# Patient Record
Sex: FEMALE | Race: WHITE | Employment: OTHER | URBAN - METROPOLITAN AREA
[De-identification: names, ages, dates, MRNs, and addresses within clinical notes are randomized per-mention and may not be internally consistent; named-entity substitution may affect disease eponyms.]

---

## 2017-09-29 ENCOUNTER — HOSPITAL ENCOUNTER (OUTPATIENT)
Dept: WOUND CARE | Age: 82
Discharge: HOME | End: 2017-09-29
Payer: MEDICARE

## 2017-09-29 PROCEDURE — 97597 DBRDMT OPN WND 1ST 20 CM/<: CPT

## 2017-09-29 RX ORDER — FLUTICASONE PROPIONATE 220 UG/1
2 AEROSOL, METERED RESPIRATORY (INHALATION) 2 TIMES DAILY
COMMUNITY
End: 2018-11-05 | Stop reason: SDUPTHER

## 2017-09-29 RX ORDER — BISMUTH SUBSALICYLATE 262 MG
1 TABLET,CHEWABLE ORAL DAILY
COMMUNITY

## 2017-09-29 RX ORDER — MONTELUKAST SODIUM 10 MG/1
10 TABLET ORAL DAILY
COMMUNITY
End: 2018-08-20 | Stop reason: SDUPTHER

## 2017-09-29 RX ORDER — CALCIUM CARBONATE 500(1250)
1200 TABLET ORAL DAILY
COMMUNITY

## 2017-09-29 RX ORDER — METOLAZONE 2.5 MG/1
2.5 TABLET ORAL DAILY
COMMUNITY

## 2017-09-29 RX ORDER — SPIRONOLACTONE 25 MG/1
25 TABLET ORAL DAILY
COMMUNITY
End: 2018-08-20 | Stop reason: SDUPTHER

## 2017-09-29 RX ORDER — LIDOCAINE HYDROCHLORIDE 20 MG/ML
JELLY TOPICAL ONCE
Status: COMPLETED | OUTPATIENT
Start: 2017-09-29 | End: 2017-09-29

## 2017-09-29 RX ORDER — ACETAMINOPHEN 325 MG/1
500 TABLET ORAL
COMMUNITY
End: 2018-09-26

## 2017-09-29 RX ORDER — SIMVASTATIN 20 MG/1
20 TABLET, FILM COATED ORAL
COMMUNITY
End: 2018-08-20 | Stop reason: SDUPTHER

## 2017-09-29 RX ORDER — ALBUTEROL SULFATE 90 UG/1
2 AEROSOL, METERED RESPIRATORY (INHALATION)
COMMUNITY
End: 2018-11-02 | Stop reason: SDUPTHER

## 2017-09-29 RX ORDER — MIRTAZAPINE 15 MG/1
15 TABLET, FILM COATED ORAL
COMMUNITY
End: 2018-09-17 | Stop reason: SDUPTHER

## 2017-09-29 RX ORDER — ACETAMINOPHEN AND CODEINE PHOSPHATE 300; 30 MG/1; MG/1
1 TABLET ORAL
COMMUNITY
End: 2018-09-26

## 2017-09-29 RX ORDER — FUROSEMIDE 80 MG/1
80 TABLET ORAL DAILY
COMMUNITY
End: 2018-09-17 | Stop reason: SDUPTHER

## 2017-09-29 RX ADMIN — LIDOCAINE HYDROCHLORIDE: 20 JELLY TOPICAL at 08:35

## 2017-09-29 NOTE — PROGRESS NOTES
Chief Complaint (CC): torn skin R arm . Present Illness (PI): Patient reports frequent falls due to trip, being in a hurry, with no balance issues. Does use a cane and walker but this time was in a dark [employer], fell and tore skin of R arm. Previously this arm had a very large tear from a fall now healed. Past History (PMedHx): Is on Eliquis due to A fib. Has bilateral shoulder joint repairs and arthritic knees R/L other ordonez: Mariposa Eliud Medications and Allergies: as per today's data for this patient in '[facility]'. I have reviewed and concur. Illnesses: as per '[facility]' data noted today. Surgeries and Injuries: as per '[facility]' data noted today. Review of Systems (ROS):                        Integumentary: Other than as noted in 'PI'; skin hair and nails normal for age, with no new rash, lumps, bumps, eruptions or bleeding. Lymph: no new prominent nodes or drainage near lymph nodes. Bones, Joints, and Muscles: Other than as noted in 'PI' no new fractures, dislocations, weakness or pain. As above. Hematopoietic: no new bleeding or bruising or anemia changes. Eliquis effect. Eyes: no recent trauma or inflammation. has. Eye glasses. has. Intra Occular Lens Implants (IOLI)                        Ears: Hearing is unchanged and usually good. Nose: no new drainage, rhinorhea or epistaxis. Mouth, and throat: no soreness, drainage or lesions. none. Dentures. Neck: no new masses, drainage or pain                        Respiratory; no hemoptysis, current shortness of breath or pain with breath. Cardiovascular: No chest pain, palpitation or tachycardia. A fib. Gastrointestinal: no recent change in appetite, stools or food tolerance.  No jaundice, hematemesis, vomiting or diarrhea                        Genito-Urinary: urine color, frequency, sensation unchanged                        Neurologic: no syncope, dizzyness or unusual sensations. Psychologic and Mental Status: no change in mood, sleep or memory recently     Social History: '[facility]' data today is noted. Daughter helps with her care. Family History: '[facility]' data today is noted. Eleazar Coburn Physical Exam:      General:  alert appropriate, NAD. Head, Eyes, Ears, Nose and Throat: normocephalic, R pupil post op changes, IOLI-OU noted. Neck: supple without masses or adenopathy. No bruit. Chest: full excursion without deformity, . Lungs: few dry crackles. .  Heart: irregular/irregular no M. Abdomen: soft round non tender (note Inguinal hernia repair). Vascular:                         Pulses:                                            R                    L                                               Radial                        ++. ++.                                              Femoral                     +.                 +.                                              DP                             +.                 +.                                              PT                             +.                 +.  Extremities: RUE skin avlulsion with ulcer and exudate near elbow laterally. Note numerous keratoses, healed scars (shoulders , arms) bruises in these areas as well as both LEs. 1+ pitting R>L LE  Neuro: normal speech, antalgic gate but good coordination     Vital signs and data recorded in '[facility]' for this patient today is noted, reviewed and considered. Patient notes today: Also reports planning to see her PCP next week. .      Procedure Note     Name of Procedure: sharp debridement. PreOp diagnosis: open wound with ulcer. .  Anaesthesia: topical lidocaine.   Description: using a sharp curette I removed the adherent exudate down to a good bleeding base..  Blood Loss: minimal.  Post Op Diagnosis, and condition: minimal pain with debridement. Follow Up Plan: begin endoform+Hydraferra blue daily. Prognosis: good opportunity for healing .

## 2017-10-06 ENCOUNTER — HOSPITAL ENCOUNTER (OUTPATIENT)
Dept: WOUND CARE | Age: 82
Discharge: HOME | End: 2017-10-06
Payer: MEDICARE

## 2017-10-06 PROCEDURE — 11042 DBRDMT SUBQ TIS 1ST 20SQCM/<: CPT

## 2017-10-06 RX ORDER — LIDOCAINE HYDROCHLORIDE 20 MG/ML
JELLY TOPICAL ONCE
Status: COMPLETED | OUTPATIENT
Start: 2017-10-06 | End: 2017-10-06

## 2017-10-06 RX ADMIN — LIDOCAINE HYDROCHLORIDE: 20 JELLY TOPICAL at 10:45

## 2017-10-06 NOTE — PROGRESS NOTES
I have noted, and reviewed today's data for this patient in [facility], and '[facility]' and concur with same. The focused physical exam today is unchanged except as noted below. Patient notes today: It better. Lesion/Wound, focused exam on Presentation today: crusted area R arm ulceration now nearly covers wound with some granulation and exudate remaining in open area. Procedure:     Wound # R arm. Procedure name: sharp debridment, selective. Anaesthesia: topical lidocaine. Description: using a sharp curette I removed adherent debri and exudate over the exposed areas of ulceration R arm leaving intact, dry crust undisturbed. .  Blood Loss: minimal.  Post Procedure Condition/ Diagnosis: open wound with ulceration healing. Follow up plan today: use hydroferra only over the open area, allow area to crust and dry at which point just protect the wound from trauma. Return 2 weeks. Paula Portillo

## 2017-10-16 ENCOUNTER — HOSPITAL ENCOUNTER (OUTPATIENT)
Dept: WOUND CARE | Age: 82
Discharge: HOME | End: 2017-10-16
Payer: MEDICARE

## 2017-10-16 PROCEDURE — 99212 OFFICE O/P EST SF 10 MIN: CPT

## 2017-10-16 RX ORDER — CEPHALEXIN 500 MG/1
500 CAPSULE ORAL 4 TIMES DAILY
COMMUNITY
End: 2018-09-06 | Stop reason: ALTCHOICE

## 2017-10-16 NOTE — PROGRESS NOTES
I have noted, and reviewed today's data for this patient in [facility], and '[facility]' and concur with same. The focused physical exam is unchanged today except as noted below. Patient Report: I'm better, no dressings needed since last Wednesday. .  Lesion/Wound, focused exam on Presentation today: healed open wound with good pink epithelium throughout. .    Follow up plan:   Discharge [facility] return as needed. Werner Christy

## 2018-07-24 ENCOUNTER — OFFICE VISIT (OUTPATIENT)
Dept: FAMILY MEDICINE CLINIC | Age: 83
End: 2018-07-24

## 2018-07-24 VITALS
OXYGEN SATURATION: 93 % | RESPIRATION RATE: 18 BRPM | DIASTOLIC BLOOD PRESSURE: 70 MMHG | TEMPERATURE: 97.6 F | BODY MASS INDEX: 37.19 KG/M2 | HEART RATE: 87 BPM | HEIGHT: 61 IN | SYSTOLIC BLOOD PRESSURE: 101 MMHG | WEIGHT: 197 LBS

## 2018-07-24 DIAGNOSIS — E78.2 MIXED HYPERLIPIDEMIA: ICD-10-CM

## 2018-07-24 DIAGNOSIS — I50.32 DIASTOLIC CHF, CHRONIC (HCC): ICD-10-CM

## 2018-07-24 DIAGNOSIS — D50.9 IRON DEFICIENCY ANEMIA, UNSPECIFIED IRON DEFICIENCY ANEMIA TYPE: ICD-10-CM

## 2018-07-24 DIAGNOSIS — Z01.818 PREOP EXAMINATION: Primary | ICD-10-CM

## 2018-07-24 DIAGNOSIS — I48.91 ATRIAL FIBRILLATION, UNSPECIFIED TYPE (HCC): ICD-10-CM

## 2018-07-24 DIAGNOSIS — G47.00 INSOMNIA, UNSPECIFIED TYPE: ICD-10-CM

## 2018-07-24 RX ORDER — LANOLIN ALCOHOL/MO/W.PET/CERES
CREAM (GRAM) TOPICAL
COMMUNITY

## 2018-07-24 RX ORDER — CHOLECALCIFEROL (VITAMIN D3) 125 MCG
5 CAPSULE ORAL
COMMUNITY

## 2018-07-24 RX ORDER — MELATONIN
DAILY
COMMUNITY

## 2018-07-24 NOTE — PROGRESS NOTES
Mayme Osgood is a 80 y.o. female who present for pre-operative evaluation. Mayme Osgood was referred for evaluation by:Dr. Enrique Arteaga for Pre- Op Evaluation. Surgeon:  Dr Maurice Russo [facility])  Surgery:  Carpel tunnel release   Anesthesia type: local anesthesia  Indication:  Symptomatic relief, numbness in hands. Date of Surgery: August 1st     Moved to [place] in July. Akilah Hummel in winter time, goes to [place] to son other times. CHF  - Lasix 80mg daily, Azroxylyn daily prn, EF 55-75% per last assessment  - Saw Dr Joselito Mata yesterday (7/25) - had EKG done and was cleared from his perspective  - ROS: no sob, no cp, no edema    Afib   - Eliquis 5mg BID - will be off of it 3 days prior to surgery per her cardiologist  - ROS: no palpitation    Asthma - 2L oxygen at night. - Albuterol inhaler 2 puff everyday  - Flovent 220   - Singular daily  - thinking about establishing care with pulmonologist  - ROS: no new cough, baseline sob    HLD  - simvastatin 20mg daily    Sleep   - Remeron 15mg, no ADR per pt    Anemia - on Fe supplement    Allergies: Allergies   Allergen Reactions    Morphine Itching     Latex allergy: no    Surgical risk:  Intermediate   Low:  Cataract, breast, dental, endoscopy  Intermediate:  Orthopedic, abdominal, thoracic, carotid endarterctomy, prostate  High:  Vascular, aortic, emergent, high risk of blood loss    Patient risks:    Age:  80 y.o. Abnormal EKG: Afib with HR in 80s done at the cardiologist   Obesity:  Yes, Body mass index is 37.22 kg/(m^2). CAD:  no  MI < 6 weeks:  no  Chest pain or exertional dyspnea:  no  Heart rhythm problems:  Afib   Syncope:  no  Heart valve problems:  no  CHF:  yes    Diagnosed diabetes:  no  H/o CVA:  no  kidney disease:  no  Screening for ETOH use:  Done and low risk  Smoking status:  no    Functional assessment: limited, can not walk 2 blocks and up a flight of steps carrying groceries and functional capacity (< 4 METS).  Pt is cleared by cardiology as getting only local anesthesia    Personal or FH of bleeding problems:  no  Personal or FH of blood clots:  no  Personal or FH of anesthesia problems:  no    Pulmonary Risk:  Asthma or COPD:  Asthma with hx of bronchitis  Obesity:  Body mass index is 37.22 kg/(m^2). Surgery close to diaphragm:  no  Known AGUSTO:  no  Albumin normal, BUN elevated    Labs: pending labs    Past Medical History:   Diagnosis Date    Asthma     Atrial fibrillation (HCC)     Congestive heart failure (HCC)      Past Surgical History:   Procedure Laterality Date    HX CHOLECYSTECTOMY      HX KNEE REPLACEMENT Bilateral        Medications:  Current Outpatient Prescriptions   Medication Sig Dispense Refill    cholecalciferol (VITAMIN D3) 1,000 unit tablet Take  by mouth daily.  melatonin tab tablet Take  by mouth nightly.  docusate sodium (STOOL SOFTENER PO) Take  by mouth as needed.  ferrous sulfate 325 mg (65 mg iron) tablet Take  by mouth Daily (before breakfast).  simvastatin (ZOCOR) 20 mg tablet Take 20 mg by mouth nightly.  apixaban (ELIQUIS) 5 mg tablet Take 5 mg by mouth two (2) times a day.  furosemide (LASIX) 80 mg tablet Take 80 mg by mouth daily.  mirtazapine (REMERON) 15 mg tablet Take 15 mg by mouth nightly. 2- 15 mg      montelukast (SINGULAIR) 10 mg tablet Take 10 mg by mouth daily.  spironolactone (ALDACTONE) 25 mg tablet Take 25 mg by mouth daily.  albuterol (PROVENTIL HFA, VENTOLIN HFA, PROAIR HFA) 90 mcg/actuation inhaler Take 2 Puffs by inhalation.  fluticasone (FLOVENT HFA) 220 mcg/actuation inhaler Take 2 Puffs by inhalation two (2) times a day.  metOLazone (ZAROXOLYN) 2.5 mg tablet Take 2.5 mg by mouth daily. For weight gain 3 lbs      multivitamin (ONE A DAY) tablet Take 1 Tab by mouth daily.  acetaminophen (TYLENOL) 325 mg tablet Take 325 mg by mouth every four (4) hours as needed for Pain.       calcium carbonate (OS-JUSTIN) 500 mg calcium (1,250 mg) tablet Take 1,200 Tabs by mouth daily.  cephALEXin (KEFLEX) 500 mg capsule Take 500 mg by mouth four (4) times daily.  acetaminophen-codeine (TYLENOL-CODEINE #3) 300-30 mg per tablet Take 1 Tab by mouth every four (4) hours as needed for Pain. Allergies: Allergies   Allergen Reactions    Morphine Itching       LMP:  No LMP recorded. Social History     Social History    Marital status: UNKNOWN     Spouse name: N/A    Number of children: N/A    Years of education: N/A     Occupational History    Not on file. Social History Main Topics    Smoking status: Never Smoker    Smokeless tobacco: Never Used    Alcohol use Yes      Comment: occasional glass of wine    Drug use: No    Sexual activity: Not on file     Other Topics Concern    Not on file     Social History Narrative    No narrative on file       No family history on file.     Revised Cardiac Risk Index Score: one point for each  High-risk surgery = 0  CAD = 0  CVD = 0  Renal insufficiency, 1.06 (4 month ago on routine check, no other records)  DM = 0     If RCRS > 2 provide beta blockade    Hearing aid - b/l     ROS:  Headaches:  no  Chest Pain:  no  SOB:  no  Fevers:  no  Other significant ROS:  no      Physical Exam  Visit Vitals    /70 (BP 1 Location: Left arm, BP Patient Position: Sitting)    Pulse 87    Temp 97.6 °F (36.4 °C) (Oral)    Resp 18    Ht 5' 1\" (1.549 m)    Wt 197 lb (89.4 kg)    SpO2 93%    BMI 37.22 kg/m2     BP Readings from Last 3 Encounters:   07/24/18 101/70     Constitutional: Appears well,  No Acute Distress, Vitals noted  Psychiatric:  Affect normal, Alert and Oriented to person/place/time    Eyes:  Pupils equally round and reactive, EOMI, conjunctiva clear, eyelids normal  ENT:  External ears and nose normal/lips, teeth=OK/gums normal, TMs and Orophyarynx normal  Neck:  general inspection and Thyroid normal.  No abnormal cervical or supraclavicular nodes    Lungs: clear to auscultation, good respiratory effort, on 2L oxygen  Heart:  irreguar rate and irregular rhythm. No cardiac murmurs. No carotid bruits or palpable thrills. Chest wall normal    Extremities:  without edema, good peripheral pulses  Skin:  Warm to palpation, without rashes, bruising, or suspicious lesions       Diagnoses and all orders for this visit:    1. Preop examination. Pt is cleared by cardiology for the surgery. Saw them on (7/25). -     CBC WITH AUTOMATED DIFF  -     METABOLIC PANEL, COMPREHENSIVE  -     HEMOGLOBIN A1C WITH EAG    2. Diastolic CHF, chronic ([address] Managed by cardiology. Continue lasix and azroxylyn. 3. Atrial fibrillation, unspecified type ([address] Seen by Cardiology. EKG with afib HR 80s. 4. Mixed hyperlipidemia. Continue Simvastatin. 5. Insomnia, unspecified type. Stable on Remeron. 6. Iron deficiency anemia, unspecified iron deficiency anemia type. F/u labs. Assessment:  Patient is acceptable risk for surgery for local anesthetics. Cleared by cardiology.

## 2018-07-24 NOTE — PROGRESS NOTES
1. Have you been to the ER, urgent care clinic since your last visit? Hospitalized since your last visit? No    2. Have you seen or consulted any other health care providers outside of the [address] [place] [address] since your last visit? Include any pap smears or colon screening. Yes, Dr. Trish Schirmer, [address] [place] [address],  and Dr. Christiano Cadena, Cardiology    Chief Complaint   Patient presents with    Pre-op Exam       Per patient, wears 2 LPM of oxygen at night. Blood pressure 101/70, pulse 87, temperature 97.6 °F (36.4 °C), temperature source Oral, resp. rate 18, height 5' 1\" (1.549 m), weight 197 lb (89.4 kg), SpO2 93 %.

## 2018-07-24 NOTE — PATIENT INSTRUCTIONS

## 2018-07-24 NOTE — MR AVS SNAPSHOT
[address] 
[phone] Patient: Carlos Malone MRN: [id number] EEF:[id number] Visit Information Date & Time Provider Department Dept. Phone Encounter #  
 7/24/2018  1:30 PM Mode Perera, [facility] [phone] [id number] Follow-up Instructions Return if symptoms worsen or fail to improve, for and also wellness check up. Your Appointments 8/16/2018  8:30 AM  
New Patient with Maria L Neumann MD  
[address] [place] [address] [facility]) Appt Note: NP est PCP 07/17/18 eros; NP est PCP 07/17/18 eros  
 [address] [place] [address]. [address] [place] [address]  
[phone]  
  
   
 [address] [place] [address] [phone] Upcoming Health Maintenance Date Due DTaP/Tdap/Td series (1 - Tdap) 6/17/1955 ZOSTER VACCINE AGE 60> 4/17/1994 GLAUCOMA SCREENING Q2Y 6/17/1999 Bone Densitometry (Dexa) Screening 6/17/1999 Pneumococcal 65+ Low/Medium Risk (1 of 2 - PCV13) 6/17/1999 MEDICARE YEARLY EXAM 3/14/2018 Influenza Age 5 to Adult 8/1/2018 Allergies as of 7/24/2018  Review Complete On: 7/24/2018 By: Darien Clements LPN Severity Noted Reaction Type Reactions Morphine  09/29/2017    Itching Current Immunizations  Never Reviewed No immunizations on file. Not reviewed this visit You Were Diagnosed With   
  
 Codes Comments Preop examination    -  Primary ICD-10-CM: Z92.596 ICD-9-CM: V72.84 Diastolic CHF, chronic (HCC)     ICD-10-CM: I50.32 
ICD-9-CM: 428.32, 428.0 Atrial fibrillation, unspecified type ([facility]     ICD-10-CM: I48.91 
ICD-9-CM: 427.31 Mixed hyperlipidemia     ICD-10-CM: E78.2 ICD-9-CM: 272.2 Insomnia, unspecified type     ICD-10-CM: G47.00 ICD-9-CM: 780.52 Iron deficiency anemia, unspecified iron deficiency anemia type     ICD-10-CM: D50.9 ICD-9-CM: 280.9 Vitals BP Pulse Temp Resp Height(growth percentile) Weight(growth percentile) 101/70 (BP 1 Location: Left arm, BP Patient Position: Sitting) 87 97.6 °F (36.4 °C) (Oral) 18 5' 1\" (1.549 m) 197 lb (89.4 kg) SpO2 BMI Smoking Status 93% 37.22 kg/m2 Never Smoker BMI and BSA Data Body Mass Index Body Surface Area  
 37.22 kg/m 2 1.96 m 2 Your Updated Medication List  
  
   
This list is accurate as of 7/24/18  2:35 PM.  Always use your most recent med list.  
  
  
  
  
 albuterol 90 mcg/actuation inhaler Commonly known as:  PROVENTIL HFA, VENTOLIN HFA, PROAIR HFA Take 2 Puffs by inhalation. ALDACTONE 25 mg tablet Generic drug:  spironolactone Take 25 mg by mouth daily. calcium carbonate 500 mg calcium (1,250 mg) tablet Commonly known as:  OS-JUSTIN Take 1,200 Tabs by mouth daily. ELIQUIS 5 mg tablet Generic drug:  apixaban Take 5 mg by mouth two (2) times a day. ferrous sulfate 325 mg (65 mg iron) tablet Take  by mouth Daily (before breakfast). FLOVENT  mcg/actuation inhaler Generic drug:  fluticasone Take 2 Puffs by inhalation two (2) times a day. KEFLEX 500 mg capsule Generic drug:  cephALEXin Take 500 mg by mouth four (4) times daily. LASIX 80 mg tablet Generic drug:  furosemide Take 80 mg by mouth daily. melatonin Tab tablet Take  by mouth nightly. mirtazapine 15 mg tablet Commonly known as:  Salma Shraddha Take 15 mg by mouth nightly. 2- 15 mg  
  
 multivitamin tablet Commonly known as:  ONE A DAY Take 1 Tab by mouth daily. simvastatin 20 mg tablet Commonly known as:  ZOCOR Take 20 mg by mouth nightly. SINGULAIR 10 mg tablet Generic drug:  montelukast  
Take 10 mg by mouth daily. STOOL SOFTENER PO Take  by mouth as needed. TYLENOL 325 mg tablet Generic drug:  acetaminophen Take 325 mg by mouth every four (4) hours as needed for Pain. TYLENOL-CODEINE #3 300-30 mg per tablet Generic drug:  acetaminophen-codeine Take 1 Tab by mouth every four (4) hours as needed for Pain. VITAMIN D3 1,000 unit tablet Generic drug:  cholecalciferol Take  by mouth daily. ZAROXOLYN 2.5 mg tablet Generic drug:  metOLazone Take 2.5 mg by mouth daily. For weight gain 3 lbs We Performed the Following CBC WITH AUTOMATED DIFF [74803 CPT(R)] HEMOGLOBIN A1C WITH EAG [73719 CPT(R)] METABOLIC PANEL, COMPREHENSIVE [92728 CPT(R)] Follow-up Instructions Return if symptoms worsen or fail to improve, for and also wellness check up. Patient Instructions Well Visit, Over 72: Care Instructions Your Care Instructions Physical exams can help you stay healthy. Your doctor has checked your overall health and may have suggested ways to take good care of yourself. He or she also may have recommended tests. At home, you can help prevent illness with healthy eating, regular exercise, and other steps. Follow-up care is a key part of your treatment and safety. Be sure to make and go to all appointments, and call your doctor if you are having problems. It's also a good idea to know your test results and keep a list of the medicines you take. How can you care for yourself at home? · Reach and stay at a healthy weight. This will lower your risk for many problems, such as obesity, diabetes, heart disease, and high blood pressure. · Get at least 30 minutes of exercise on most days of the week. Walking is a good choice. You also may want to do other activities, such as running, swimming, cycling, or playing tennis or team sports. · Do not smoke. Smoking can make health problems worse. If you need help quitting, talk to your doctor about stop-smoking programs and medicines. These can increase your chances of quitting for good. · Protect your skin from too much sun. When you're outdoors from 10 a.m. to 4 p.m., stay in the shade or cover up with clothing and a hat with a wide brim. Wear sunglasses that block UV rays. Even when it's cloudy, put broad-spectrum sunscreen (SPF 30 or higher) on any exposed skin. · See a dentist one or two times a year for checkups and to have your teeth cleaned. · Wear a seat belt in the car. · Limit alcohol to 2 drinks a day for men and 1 drink a day for women. Too much alcohol can cause health problems. Follow your doctor's advice about when to have certain tests. These tests can spot problems early. For men and women · Cholesterol. Your doctor will tell you how often to have this done based on your overall health and other things that can increase your risk for heart attack and stroke. · Blood pressure. Have your blood pressure checked during a routine doctor visit. Your doctor will tell you how often to check your blood pressure based on your age, your blood pressure results, and other factors. · Diabetes. Ask your doctor whether you should have tests for diabetes. · Vision. Experts recommend that you have yearly exams for glaucoma and other age-related eye problems. · Hearing. Tell your doctor if you notice any change in your hearing. You can have tests to find out how well you hear. · Colon cancer tests. Keep having colon cancer tests as your doctor recommends. You can have one of several types of tests. · Heart attack and stroke risk. At least every 4 to 6 years, you should have your risk for heart attack and stroke assessed. Your doctor uses factors such as your age, blood pressure, cholesterol, and whether you smoke or have diabetes to show what your risk for a heart attack or stroke is over the next 10 years. · Osteoporosis. Talk to your doctor about whether you should have a bone density test to find out whether you have thinning bones. Also ask your doctor about whether you should take calcium and vitamin D supplements. For women · Pap test and pelvic exam. You may no longer need a Pap test. Talk with your doctor about whether to stop or continue to have Pap tests. · Breast exam and mammogram. Ask how often you should have a mammogram, which is an X-ray of your breasts. A mammogram can spot breast cancer before it can be felt and when it is easiest to treat. · Thyroid disease. Talk to your doctor about whether to have your thyroid checked as part of a regular physical exam. Women have an increased chance of a thyroid problem. For men · Prostate exam. Talk to your doctor about whether you should have a blood test (called a PSA test) for prostate cancer. Experts disagree on whether men should have this test. Some experts recommend that you discuss the benefits and risks of the test with your doctor. · Abdominal aortic aneurysm. Ask your doctor whether you should have a test to check for an aneurysm. You may need a test if you ever smoked or if your parent, brother, sister, or child has had an aneurysm. When should you call for help? Watch closely for changes in your health, and be sure to contact your doctor if you have any problems or symptoms that concern you. Where can you learn more? Go to http://danay-dylan.info/. Enter Y388 in the search box to learn more about \"Well Visit, Over 65: Care Instructions. \" Current as of: May 16, 2017 Content Version: 11.7 © 9153-7565 [employer]. Care instructions adapted under license by [employer] (which disclaims liability or warranty for this information). If you have questions about a medical condition or this instruction, always ask your healthcare professional. [place] [address] any warranty or liability for your use of this information. Introducing [facility]!    
 Franco Pires introduces [employer] patient portal. Now you can access parts of your medical record, email your doctor's office, and request medication refills online. 1. In your internet browser, go to [email or web address] 2. Click on the First Time User? Click Here link in the Sign In box. You will see the New Member Sign Up page. 3. Enter your RealSelf Access Code exactly as it appears below. You will not need to use this code after youve completed the sign-up process. If you do not sign up before the expiration date, you must request a new code. · RealSelf Access Code: [email or web address] Expires: 10/22/2018  2:23 PM 
 
4. Enter the last four digits of your Social Security Number (xxxx) and Date of Birth (mm/dd/yyyy) as indicated and click Submit. You will be taken to the next sign-up page. 5. Create a RealSelf ID. This will be your RealSelf login ID and cannot be changed, so think of one that is secure and easy to remember. 6. Create a RealSelf password. You can change your password at any time. 7. Enter your Password Reset Question and Answer. This can be used at a later time if you forget your password. 8. Enter your e-mail address. You will receive e-mail notification when new information is available in [address] 9. Click Sign Up. You can now view and download portions of your medical record. 10. Click the Download Summary menu link to download a portable copy of your medical information. If you have questions, please visit the Frequently Asked Questions section of the RealSelf website. Remember, RealSelf is NOT to be used for urgent needs. For medical emergencies, dial 911. Now available from your iPhone and Android! Please provide this summary of care documentation to your next provider. Your primary care clinician is listed as Irma Farah. If you have any questions after today's visit, please call [phone].

## 2018-07-25 LAB
ALBUMIN SERPL-MCNC: 4.5 G/DL (ref 3.5–4.7)
ALBUMIN/GLOB SERPL: 1.7 {RATIO} (ref 1.2–2.2)
ALP SERPL-CCNC: 100 IU/L (ref 39–117)
ALT SERPL-CCNC: 10 IU/L (ref 0–32)
AST SERPL-CCNC: 20 IU/L (ref 0–40)
BASOPHILS # BLD AUTO: 0 X10E3/UL (ref 0–0.2)
BASOPHILS NFR BLD AUTO: 0 %
BILIRUB SERPL-MCNC: 0.5 MG/DL (ref 0–1.2)
BUN SERPL-MCNC: 45 MG/DL (ref 8–27)
BUN/CREAT SERPL: 35 (ref 12–28)
CALCIUM SERPL-MCNC: 9.6 MG/DL (ref 8.7–10.3)
CHLORIDE SERPL-SCNC: 92 MMOL/L (ref 96–106)
CO2 SERPL-SCNC: 31 MMOL/L (ref 20–29)
CREAT SERPL-MCNC: 1.27 MG/DL (ref 0.57–1)
EOSINOPHIL # BLD AUTO: 0.2 X10E3/UL (ref 0–0.4)
EOSINOPHIL NFR BLD AUTO: 3 %
ERYTHROCYTE [DISTWIDTH] IN BLOOD BY AUTOMATED COUNT: 17.2 % (ref 12.3–15.4)
EST. AVERAGE GLUCOSE BLD GHB EST-MCNC: 105 MG/DL
GLOBULIN SER CALC-MCNC: 2.6 G/DL (ref 1.5–4.5)
GLUCOSE SERPL-MCNC: 100 MG/DL (ref 65–99)
HBA1C MFR BLD: 5.3 % (ref 4.8–5.6)
HCT VFR BLD AUTO: 41.7 % (ref 34–46.6)
HGB BLD-MCNC: 13.4 G/DL (ref 11.1–15.9)
IMM GRANULOCYTES # BLD: 0 X10E3/UL (ref 0–0.1)
IMM GRANULOCYTES NFR BLD: 0 %
INTERPRETATION: NORMAL
LYMPHOCYTES # BLD AUTO: 1.4 X10E3/UL (ref 0.7–3.1)
LYMPHOCYTES NFR BLD AUTO: 25 %
MCH RBC QN AUTO: 29.7 PG (ref 26.6–33)
MCHC RBC AUTO-ENTMCNC: 32.1 G/DL (ref 31.5–35.7)
MCV RBC AUTO: 93 FL (ref 79–97)
MONOCYTES # BLD AUTO: 0.4 X10E3/UL (ref 0.1–0.9)
MONOCYTES NFR BLD AUTO: 7 %
NEUTROPHILS # BLD AUTO: 3.6 X10E3/UL (ref 1.4–7)
NEUTROPHILS NFR BLD AUTO: 65 %
PLATELET # BLD AUTO: 276 X10E3/UL (ref 150–379)
POTASSIUM SERPL-SCNC: 4.1 MMOL/L (ref 3.5–5.2)
PROT SERPL-MCNC: 7.1 G/DL (ref 6–8.5)
RBC # BLD AUTO: 4.51 X10E6/UL (ref 3.77–5.28)
SODIUM SERPL-SCNC: 142 MMOL/L (ref 134–144)
WBC # BLD AUTO: 5.6 X10E3/UL (ref 3.4–10.8)

## 2018-07-26 NOTE — PROGRESS NOTES
CBC normal. Cr 1.27 (per pt's record viewed in office, previous Cr is 1.06 on 4/2018, no other records). Hgb normal. Sent letter to [address] [place] [address] (Dr Stew Jerry in relation to preop eval).

## 2018-08-16 ENCOUNTER — OFFICE VISIT (OUTPATIENT)
Dept: FAMILY MEDICINE CLINIC | Age: 83
End: 2018-08-16

## 2018-08-16 VITALS
HEIGHT: 61 IN | RESPIRATION RATE: 18 BRPM | HEART RATE: 94 BPM | BODY MASS INDEX: 37.19 KG/M2 | TEMPERATURE: 98.2 F | SYSTOLIC BLOOD PRESSURE: 91 MMHG | WEIGHT: 197 LBS | DIASTOLIC BLOOD PRESSURE: 60 MMHG

## 2018-08-16 DIAGNOSIS — I50.9 CHRONIC CONGESTIVE HEART FAILURE, UNSPECIFIED HEART FAILURE TYPE (HCC): ICD-10-CM

## 2018-08-16 DIAGNOSIS — Z99.89 USES WALKER: ICD-10-CM

## 2018-08-16 DIAGNOSIS — I48.19 PERSISTENT ATRIAL FIBRILLATION (HCC): Primary | ICD-10-CM

## 2018-08-16 DIAGNOSIS — R29.6 FREQUENT FALLS: ICD-10-CM

## 2018-08-16 DIAGNOSIS — N18.30 STAGE 3 CHRONIC KIDNEY DISEASE (HCC): ICD-10-CM

## 2018-08-16 DIAGNOSIS — J45.40 MODERATE PERSISTENT ASTHMA WITHOUT COMPLICATION: ICD-10-CM

## 2018-08-16 DIAGNOSIS — Z23 ENCOUNTER FOR IMMUNIZATION: ICD-10-CM

## 2018-08-16 PROBLEM — E66.01 SEVERE OBESITY (BMI 35.0-39.9): Status: ACTIVE | Noted: 2018-08-16

## 2018-08-16 NOTE — PATIENT INSTRUCTIONS
Live Zoster (Shingles) Vaccine, ZVL: What you need to know  What is shingles? Shingles (also called herpes zoster, or just zoster) is a painful skin rash, often with blisters. Shingles is caused by the varicella zoster virus, the same virus that causes chickenpox. After you have chickenpox, the virus stays in your body and can cause shingles later in life. You can't catch shingles from another person. However, a person who has never had chickenpox (or chickenpox vaccine) could get chickenpox from someone with shingles. A shingles rash usually appears on one side of the face or body and heals within 2 to 4 weeks. Its main symptom is pain, which can be severe. Other symptoms can include fever, headache, chills, and upset stomach. Very rarely, a shingles infection can lead to pneumonia, hearing problems, blindness, brain inflammation (encephalitis), or death. For about 1 person in 5, severe pain can continue even long after the rash has cleared up. This long-lasting pain is called post-herpetic neuralgia (PHN). Shingles is far more common in people 48years of age and older than in younger people, and the risk increases with age. It is also more common in people whose immune system is weakened because of a disease such as cancer or by drugs such as steroids or chemotherapy. At least 1 million people a year in the [facility] get shingles. Shingles vaccine (live)  A live shingles vaccine was approved by FDA in 2006. In a clinical trial, the vaccine reduced the risk of shingles by about 50% in people 60 and older. It can reduce the likelihood of PHN, and reduce pain in some people who still get shingles after being vaccinated. The recommended schedule for live shingles vaccine is a single dose for adults 61years of age and older. Some people should not get this vaccine  Tell your vaccine provider if you:  · Have any severe, life-threatening allergies.  A person who has ever had a life-threatening allergic reaction after a dose of live shingles vaccine, or has a severe allergy to any component of this vaccine, may be advised not to be vaccinated. Ask your health care provider if you want information about vaccine components. · Are pregnant, or think you might be pregnant. Pregnant women should wait to get live shingles vaccine until they are no longer pregnant. Women should avoid getting pregnant for at least 1 month after getting shingles vaccine. · Have a weakened immune system due to disease (such as cancer or AIDS) or medical treatments (such as radiation, immunotherapy, high-dose steroids, or chemotherapy). · Are not feeling well. If you have a mild illness, such as a cold, you can probably get the vaccine today. If you are moderately or severely ill, you should probably wait until you recover. Your doctor can advise you. Risks of a vaccine reaction  With any medicine, including vaccines, there is a chance of reactions. After live shingles vaccination, a person might experience:  · Redness, soreness, swelling, or itching at the site of the injection  · Headache  These events are usually mild and go away on their own. Rarely, live shingles vaccine can cause rash or shingles. Other things that could happen after this vaccine:  · People sometimes faint after medical procedures, including vaccination. Sitting or lying down for about 15 minutes can help prevent fainting and injuries caused by a fall. Tell your provider if you feel dizzy or have vision changes or ringing in the ears. · Some people get shoulder pain that can be more severe and longer-lasting than routine soreness that can follow injections. This happens very rarely. · Any medication can cause a severe allergic reaction. Such reactions to a vaccine are estimated at about 1 in a million doses, and would happen within a few minutes to a few hours after the vaccination.   As with any medicine, there is a very remote chance of a vaccine causing a serious injury or death. The safety of vaccines is always being monitored. For more information, visit: www.cdc.gov/vaccinesafety/  What if there is a serious problem? What should I look for? · Look for anything that concerns you, such as signs of a severe allergic reaction, very high fever, or unusual behavior. Signs of a severe allergic reaction can include hives, swelling of the face and throat, difficulty breathing, a fast heartbeat, dizziness, and weakness. These would usually start a few minutes to a few hours after the vaccination. Afterward, the reaction should be reported to the Vaccine Adverse Event Reporting System (VAERS). Your doctor should file this report, or you can do it yourself through the VAERS website at www.vaers. [facility].gov, or by calling 9-337.941.8737. VAERS does not give medical advice. .  How can I learn more? · Ask your health care provider. He or she can give you the vaccine package insert or suggest other sources of information. · Call your local or state health department. · Contact the Centers for Disease Control and Prevention (CDC):  ¨ Call 3-722.953.7542 (1-800-CDC-INFO) or  ¨ Visit CDC's website at www.cdc.gov/vaccines  Vaccine Information Statement  Live Zoster Vaccine  2/12/2018  [facility] and [facility] for Disease Control and Prevention  Many Vaccine Information Statements are available in English and other languages. See www.immunize.org/vis  Hojas de Información Sobre Vacunas están disponibles en Español y en muchos otros idiomas. Visite Britni.si   Care instructions adapted under license by [employer] (which disclaims liability or warranty for this information). If you have questions about a medical condition or this instruction, always ask your healthcare professional. [place] [address] any warranty or liability for your use of this information.

## 2018-08-16 NOTE — PROGRESS NOTES
Chief Complaint   Patient presents with   [facility] Establish Care       1. Have you been to the ER, urgent care clinic since your last visit? Hospitalized since your last visit? No    2. Have you seen or consulted any other health care providers outside of the [address] [place] [address] since your last visit? Include any pap smears or colon screening. No    Patient states she had vaginal surgery approximate late 60's. Patient use oxygen 2L only at night, have an appointment with pulmonologist September 6,2018 Dr Linda Lucio. Shingrix ordered per Verbal order of Dr Edy Person. Patient given VIS for shingrix vaccination. No issues or concerns after vaccination. Informed patient its a 2 dose vaccination in which patient states she will return in November for the 2nd dose.

## 2018-08-16 NOTE — PROGRESS NOTES
History of Present Illness:     Chief Complaint   Patient presents with   [address]     Pt is a 80y.o. year old female    Presents to clinic to establish care. Recently moved from [place]. Lives with each of her 3 children 4 months out of the year (between [place], [place] and [facility]). Lives with her daughter Roderick Gardner. Follow up with Pulm scheduled September for asthma. Uses O2 at night when sleeping. Takes Tylenol 2500mg daily for severe spinal stenosis for 5-10 years. Saw surgical neurologist 1 year ago and had improvement after a procedure. Afib and CHF. Taking Eliquis. Dr. Christiano Cadena is her cardiologist.     Requesting [facility] place card. Ambulates with a walker. I have reviewed patient's history as detailed below:    Past Medical History:   Diagnosis Date    Asthma     Atrial fibrillation ([address]     Congestive heart failure ([address]          Past Surgical History:   Procedure Laterality Date    HX CHOLECYSTECTOMY      HX GASTRIC BYPASS  1979    HX HERNIA REPAIR      HX KNEE REPLACEMENT Bilateral          History reviewed. No pertinent family history. Social History     Social History    Marital status: UNKNOWN     Spouse name: N/A    Number of children: N/A    Years of education: N/A     Occupational History    Not on file. Social History Main Topics    Smoking status: Never Smoker    Smokeless tobacco: Never Used    Alcohol use Yes      Comment: occasional glass of wine    Drug use: No    Sexual activity: Not on file     Other Topics Concern    Not on file     Social History Narrative         Current Outpatient Prescriptions on File Prior to Visit   Medication Sig Dispense Refill    cholecalciferol (VITAMIN D3) 1,000 unit tablet Take  by mouth daily.  melatonin tab tablet Take 5 mg by mouth nightly.  docusate sodium (STOOL SOFTENER PO) Take  by mouth as needed.       ferrous sulfate 325 mg (65 mg iron) tablet Take  by mouth Daily (before breakfast).  simvastatin (ZOCOR) 20 mg tablet Take 20 mg by mouth nightly.  apixaban (ELIQUIS) 5 mg tablet Take 5 mg by mouth two (2) times a day.  furosemide (LASIX) 80 mg tablet Take 80 mg by mouth daily.  mirtazapine (REMERON) 15 mg tablet Take 15 mg by mouth nightly. 2- 15 mg      montelukast (SINGULAIR) 10 mg tablet Take 10 mg by mouth daily.  spironolactone (ALDACTONE) 25 mg tablet Take 25 mg by mouth daily.  albuterol (PROVENTIL HFA, VENTOLIN HFA, PROAIR HFA) 90 mcg/actuation inhaler Take 2 Puffs by inhalation.  fluticasone (FLOVENT HFA) 220 mcg/actuation inhaler Take 2 Puffs by inhalation two (2) times a day.  metOLazone (ZAROXOLYN) 2.5 mg tablet Take 2.5 mg by mouth daily. For weight gain 3 lbs      multivitamin (ONE A DAY) tablet Take 1 Tab by mouth daily.  acetaminophen (TYLENOL) 325 mg tablet Take 500 mg by mouth every four (4) hours as needed for Pain. Indications: Patient takes 1000mg in morning, 500mg middle of day, 1000mg nightly      calcium carbonate (OS-JUSTIN) 500 mg calcium (1,250 mg) tablet Take 1,200 Tabs by mouth daily.  cephALEXin (KEFLEX) 500 mg capsule Take 500 mg by mouth four (4) times daily.  acetaminophen-codeine (TYLENOL-CODEINE #3) 300-30 mg per tablet Take 1 Tab by mouth every four (4) hours as needed for Pain. No current facility-administered medications on file prior to visit. Allergies: Allergies   Allergen Reactions    Morphine Itching         Review of systems:  Items bolded if positive. Constitutional: Fever, chills, night sweats, weight loss, lymphadenopathy, fatigue  HEENT: Vision change, eye pain, rhinorrhea, sinus pain, epistaxis, dysphagia, change in hearing, tinnitus, vertigo.    Endocrine: Weight change, heat/ cold intolerance, tremor, insomnia, polyuria, polydipsia, polyphagia, abnl hair growth, nail changes  Cardiovascular: Chest pain, palpitations, syncope, lower extremity edema, orthopnea, paroxysmal nocturnal dyspnea  Pulmonary: Shortness of breath, dyspnea on exertion, cough, hemoptysis, wheezing  GI: Nausea, vomiting, diarrhea, melena, hematochezia, change in appetite, abdominal pain, change in bowel habits or stools  : Dysuria, frequency, urgency, incontinence, hematuria, nocturia  Musculoskeletal: joint swelling or pain, muscle pain, back pain  Skin:  Rash, New/growing/changing skin lesions  Neurologic: Headache, muscle weakness, paresthesias, anesthesia, ataxia, change in speech, change in gait   Psychiatric: depression, anxiety, hallucinations, suma, SI/HI        Objective:     Vitals:    08/16/18 0839   BP: 91/60   Pulse: 94   Resp: 18   Temp: 98.2 °F (36.8 °C)   TempSrc: Oral   Weight: 197 lb (89.4 kg)   Height: 5' 1\" (1.549 m)       Physical Exam:  General appearance - alert, well appearing, and in no distress and overweight  Mental status - alert, oriented to person, place, and time, normal mood, behavior, speech, dress, motor activity, and thought processes  Eyes - sclera anicteric, EOM intact bilaterally, wears glasses  Neck - supple, no significant adenopathy  Chest - clear to auscultation, no wheezes, rales or rhonchi, symmetric air entry  Heart - irregularly irregular rhythm with normal rate. No murmur. Abdomen - soft, nontender, nondistended, no masses or organomegaly  Neurological - alert, oriented, normal speech, no focal findings or movement disorder noted  Extremities - Trace pedal edema to above ankle. Warm, no erythema. Recent Labs:  No results found for this or any previous visit (from the past 12 hour(s)). Assessment and Plan:   Pt is a 80y.o. year old female,      ICD-10-CM ICD-9-CM    1. Persistent atrial fibrillation (HCC) I48.1 427.31    2. Chronic congestive heart failure, unspecified heart failure type (HCC) I50.9 428.0    3. Frequent falls R29.6 V15.88    4. Uses walker Z99.89 V46.8    5.  Moderate persistent asthma without complication D97.41 002.69 6. Stage 3 chronic kidney disease N18.3 585. 3      Continue current medications  Reviewed most recent labs  Pulm and Cardiology follow up planned  DMV place card requested today  Shinrix vaccine today    Pt reports having had Medicare Wellness visit at home. Will have records sent to review. Follow up in 2-3 months or sooner if needed. Sundeep Macedo MD     I have discussed the diagnosis with the patient and the intended plan as seen in the above orders. The patient has received an after-visit summary and questions were answered concerning future plans.

## 2018-08-16 NOTE — MR AVS SNAPSHOT
[address] 
[phone] Patient: America Tobar MRN: [id number] UTT:9/13/3461 Visit Information Date & Time Provider Department Dept. Phone Encounter #  
 8/16/2018  8:30 AM Marija Terrell, [address] [place] [address] [phone] [id number] Follow-up Instructions Return in about 3 months (around 11/16/2018) for Routine follow up. Your Appointments 9/6/2018  9:00 AM  
PRE OP with Tahir Pham MD  
[address] [place] [address] ([facility]) Appt Note: pre op, 09/19, for carpal tunnel [address] [facility] 3 1007 [facility]  
[phone]  
  
   
 [address] [place] [address] [address] [id number] Upcoming Health Maintenance Date Due  
 GLAUCOMA SCREENING Q2Y 6/17/1999 Bone Densitometry (Dexa) Screening 6/17/1999 MEDICARE YEARLY EXAM 3/14/2018 Influenza Age 5 to Adult 10/16/2018* ZOSTER VACCINE AGE 60> 11/16/2018* Pneumococcal 65+ Low/Medium Risk (1 of 2 - PCV13) 8/16/2019* DTaP/Tdap/Td series (1 - Tdap) 8/16/2019* *Topic was postponed. The date shown is not the original due date. Allergies as of 8/16/2018  Review Complete On: 8/16/2018 By: Marija Terrell MD  
  
 Severity Noted Reaction Type Reactions Morphine  09/29/2017    Itching Current Immunizations  Never Reviewed No immunizations on file. Not reviewed this visit You Were Diagnosed With   
  
 Codes Comments Persistent atrial fibrillation (HCC)    -  Primary ICD-10-CM: I48.1 ICD-9-CM: 427.31 Chronic congestive heart failure, unspecified heart failure type ([facility]     ICD-10-CM: I50.9 ICD-9-CM: 428.0 Frequent falls     ICD-10-CM: R29.6 ICD-9-CM: V15.88 Uses walker     ICD-10-CM: Z99.89 ICD-9-CM: V46.8 Moderate persistent asthma without complication     WXQ-64-BD: J45.40 ICD-9-CM: 493.90 Stage 3 chronic kidney disease     ICD-10-CM: N18.3 ICD-9-CM: 041. 3 Vitals BP Pulse Temp Resp Height(growth percentile) Weight(growth percentile) 91/60 (BP 1 Location: Right arm, BP Patient Position: Sitting) 94 98.2 °F (36.8 °C) (Oral) 18 5' 1\" (1.549 m) 197 lb (89.4 kg) BMI OB Status Smoking Status 37.22 kg/m2 Hysterectomy Never Smoker BMI and BSA Data Body Mass Index Body Surface Area  
 37.22 kg/m 2 1.96 m 2 Preferred Pharmacy Pharmacy Name Phone [facility] [address] [place], [address] [place] [address] [phone] Your Updated Medication List  
  
   
This list is accurate as of 8/16/18  9:07 AM.  Always use your most recent med list.  
  
  
  
  
 albuterol 90 mcg/actuation inhaler Commonly known as:  PROVENTIL HFA, VENTOLIN HFA, PROAIR HFA Take 2 Puffs by inhalation. ALDACTONE 25 mg tablet Generic drug:  spironolactone Take 25 mg by mouth daily. calcium carbonate 500 mg calcium (1,250 mg) tablet Commonly known as:  OS-JUSTIN Take 1,200 Tabs by mouth daily. ELIQUIS 5 mg tablet Generic drug:  apixaban Take 5 mg by mouth two (2) times a day. ferrous sulfate 325 mg (65 mg iron) tablet Take  by mouth Daily (before breakfast). FLOVENT  mcg/actuation inhaler Generic drug:  fluticasone Take 2 Puffs by inhalation two (2) times a day. KEFLEX 500 mg capsule Generic drug:  cephALEXin Take 500 mg by mouth four (4) times daily. LASIX 80 mg tablet Generic drug:  furosemide Take 80 mg by mouth daily. melatonin Tab tablet Take 5 mg by mouth nightly. mirtazapine 15 mg tablet Commonly known as:  Margaretta Halt Take 15 mg by mouth nightly. 2- 15 mg  
  
 multivitamin tablet Commonly known as:  ONE A DAY Take 1 Tab by mouth daily. simvastatin 20 mg tablet Commonly known as:  ZOCOR Take 20 mg by mouth nightly. SINGULAIR 10 mg tablet Generic drug:  montelukast  
Take 10 mg by mouth daily. STOOL SOFTENER PO Take  by mouth as needed. TYLENOL 325 mg tablet Generic drug:  acetaminophen Take 500 mg by mouth every four (4) hours as needed for Pain. Indications: Patient takes 1000mg in morning, 500mg middle of day, 1000mg nightly TYLENOL-CODEINE #3 300-30 mg per tablet Generic drug:  acetaminophen-codeine Take 1 Tab by mouth every four (4) hours as needed for Pain. VITAMIN D3 1,000 unit tablet Generic drug:  cholecalciferol Take  by mouth daily. ZAROXOLYN 2.5 mg tablet Generic drug:  metOLazone Take 2.5 mg by mouth daily. For weight gain 3 lbs Follow-up Instructions Return in about 3 months (around 11/16/2018) for Routine follow up. Patient Instructions Live Zoster (Shingles) Vaccine, ZVL: What you need to know What is shingles? Shingles (also called herpes zoster, or just zoster) is a painful skin rash, often with blisters. Shingles is caused by the varicella zoster virus, the same virus that causes chickenpox. After you have chickenpox, the virus stays in your body and can cause shingles later in life. You can't catch shingles from another person. However, a person who has never had chickenpox (or chickenpox vaccine) could get chickenpox from someone with shingles. A shingles rash usually appears on one side of the face or body and heals within 2 to 4 weeks. Its main symptom is pain, which can be severe. Other symptoms can include fever, headache, chills, and upset stomach. Very rarely, a shingles infection can lead to pneumonia, hearing problems, blindness, brain inflammation (encephalitis), or death. For about 1 person in 5, severe pain can continue even long after the rash has cleared up. This long-lasting pain is called post-herpetic neuralgia (PHN). Shingles is far more common in people 48years of age and older than in younger people, and the risk increases with age.  It is also more common in people whose immune system is weakened because of a disease such as cancer or by drugs such as steroids or chemotherapy. At least 1 million people a year in the [facility] get shingles. Shingles vaccine (live) A live shingles vaccine was approved by FDA in 2006. In a clinical trial, the vaccine reduced the risk of shingles by about 50% in people 60 and older. It can reduce the likelihood of PHN, and reduce pain in some people who still get shingles after being vaccinated. The recommended schedule for live shingles vaccine is a single dose for adults 61years of age and older. Some people should not get this vaccine Tell your vaccine provider if you: 
· Have any severe, life-threatening allergies. A person who has ever had a life-threatening allergic reaction after a dose of live shingles vaccine, or has a severe allergy to any component of this vaccine, may be advised not to be vaccinated. Ask your health care provider if you want information about vaccine components. · Are pregnant, or think you might be pregnant. Pregnant women should wait to get live shingles vaccine until they are no longer pregnant. Women should avoid getting pregnant for at least 1 month after getting shingles vaccine. · Have a weakened immune system due to disease (such as cancer or AIDS) or medical treatments (such as radiation, immunotherapy, high-dose steroids, or chemotherapy). · Are not feeling well. If you have a mild illness, such as a cold, you can probably get the vaccine today. If you are moderately or severely ill, you should probably wait until you recover. Your doctor can advise you. Risks of a vaccine reaction With any medicine, including vaccines, there is a chance of reactions. After live shingles vaccination, a person might experience: · Redness, soreness, swelling, or itching at the site of the injection · Headache These events are usually mild and go away on their own. Rarely, live shingles vaccine can cause rash or shingles. Other things that could happen after this vaccine: · People sometimes faint after medical procedures, including vaccination. Sitting or lying down for about 15 minutes can help prevent fainting and injuries caused by a fall. Tell your provider if you feel dizzy or have vision changes or ringing in the ears. · Some people get shoulder pain that can be more severe and longer-lasting than routine soreness that can follow injections. This happens very rarely. · Any medication can cause a severe allergic reaction. Such reactions to a vaccine are estimated at about 1 in a million doses, and would happen within a few minutes to a few hours after the vaccination. As with any medicine, there is a very remote chance of a vaccine causing a serious injury or death. The safety of vaccines is always being monitored. For more information, visit: www.cdc.gov/vaccinesafety/ What if there is a serious problem? What should I look for? · Look for anything that concerns you, such as signs of a severe allergic reaction, very high fever, or unusual behavior. Signs of a severe allergic reaction can include hives, swelling of the face and throat, difficulty breathing, a fast heartbeat, dizziness, and weakness. These would usually start a few minutes to a few hours after the vaccination. Afterward, the reaction should be reported to the Vaccine Adverse Event Reporting System (VAERS). Your doctor should file this report, or you can do it yourself through the VAERS website at www.vaers. hhs.gov, or by calling 1-108.353.7097. VAERS does not give medical advice. Eleazar Coburn How can I learn more? · Ask your health care provider. He or she can give you the vaccine package insert or suggest other sources of information. · Call your local or state health department.  
· Contact the Centers for Disease Control and Prevention (CDC): 
¨ Call 0-661.279.4493 (1-800-CDC-INFO) or 
 ¨ Visit CDC's website at www.cdc.gov/vaccines Vaccine Information Statement Live Zoster Vaccine 2/12/2018 [facility] and [employer] Centers for Disease Control and Prevention Many Vaccine Information Statements are available in Sinhala and other languages. See www.immunize.org/vis Hojas de Información Sobre Vacunas están disponibles en Español y en muchos otros idiomas. Visite Britni.si Care instructions adapted under license by [employer] (which disclaims liability or warranty for this information). If you have questions about a medical condition or this instruction, always ask your healthcare professional. [address] any warranty or liability for your use of this information. Introducing [facility]! Ra Mitchell introduces [employer] patient portal. Now you can access parts of your medical record, email your doctor's office, and request medication refills online. 1. In your internet browser, go to https://Definition 6. Tagent/Definition 6 2. Click on the First Time User? Click Here link in the Sign In box. You will see the New Member Sign Up page. 3. Enter your Cool[employer] Access Code exactly as it appears below. You will not need to use this code after youve completed the sign-up process. If you do not sign up before the expiration date, you must request a new code. · Cool[employer] Access Code: [email or web address] Expires: 10/22/2018  2:23 PM 
 
4. Enter the last four digits of your Social Security Number (xxxx) and Date of Birth (mm/dd/yyyy) as indicated and click Submit. You will be taken to the next sign-up page. 5. Create a CoolClouds ID. This will be your CoolClouds login ID and cannot be changed, so think of one that is secure and easy to remember. 6. Create a [employer]Cl[employer] password. You can change your password at any time. 7. Enter your Password Reset Question and Answer.  This can be used at a later time if you forget your password. 8. Enter your e-mail address. You will receive e-mail notification when new information is available in [address] 9. Click Sign Up. You can now view and download portions of your medical record. 10. Click the Download Summary menu link to download a portable copy of your medical information. If you have questions, please visit the Frequently Asked Questions section of the en-Gauge website. Remember, en-Gauge is NOT to be used for urgent needs. For medical emergencies, dial 911. Now available from your iPhone and Android! Please provide this summary of care documentation to your next provider. Your primary care clinician is listed as Niecy Mata. If you have any questions after today's visit, please call [phone].

## 2018-08-20 ENCOUNTER — TELEPHONE (OUTPATIENT)
Dept: FAMILY MEDICINE CLINIC | Age: 83
End: 2018-08-20

## 2018-08-20 RX ORDER — SIMVASTATIN 20 MG/1
20 TABLET, FILM COATED ORAL
Qty: 90 TAB | Refills: 1 | Status: SHIPPED | OUTPATIENT
Start: 2018-08-20 | End: 2018-11-28 | Stop reason: SDUPTHER

## 2018-08-20 RX ORDER — SPIRONOLACTONE 25 MG/1
25 TABLET ORAL DAILY
Qty: 90 TAB | Refills: 1 | Status: SHIPPED | OUTPATIENT
Start: 2018-08-20 | End: 2018-10-16 | Stop reason: SDUPTHER

## 2018-08-20 RX ORDER — MONTELUKAST SODIUM 10 MG/1
10 TABLET ORAL DAILY
Qty: 90 TAB | Refills: 1 | Status: SHIPPED | OUTPATIENT
Start: 2018-08-20 | End: 2018-11-28 | Stop reason: SDUPTHER

## 2018-08-20 NOTE — TELEPHONE ENCOUNTER
----- Message from Nick Laughlin sent at 8/20/2018  9:38 AM EDT -----  Regarding: Dr. Guillaume Wynne / Iva Dorantes, Daughter ([phone]), Stated they received a form for handicap parking but was only for 6 months. Daughter is requesting a form for a permanent pass.

## 2018-08-20 NOTE — TELEPHONE ENCOUNTER
Returned [employer] call in regards to patient. Patient was identified by 2 identiifers by Jonas Rogel. Jonas Rogel was calling inregards to [facility] placard that was given for 6 months per Dr Tricia Taylor. Jonas Rogel would like for placard to be for a year. She states patient will not be in [place] to renew placard in 6 months, to please see if it could be changed to a year. Informed patient I would have to clarify with Dr Tricia Taylor to see if its possible. In which Dr Tricia Taylor states she will do the [facility] form again for patient. Informed Jonas Rogel of Dr Tricia Taylor response, also informed her that I will give her a call once the form is complete. Jonas Rogel verbalized understanding.

## 2018-08-22 ENCOUNTER — TELEPHONE (OUTPATIENT)
Dept: FAMILY MEDICINE CLINIC | Age: 83
End: 2018-08-22

## 2018-08-22 NOTE — TELEPHONE ENCOUNTER
[phone]  Halima Wheeler, daughter, called to speak with nurse about the [facility] form. I did look at the  and did not see it. She was informed.

## 2018-08-29 NOTE — PERIOP NOTES
PAT phone call interview completed with pt's daughter West allis. Ronn goode reports that pt just had same procedure done on the opposite arm at [facility]. Informed West allis that this procedure would be performed here at [facility]. Reports that Cardiology told her mom to stop her Eliquis 3 days prior to surgery and to do the same with this procedure. Ronn goode reminded of NPO status at [facility] before DOS, reviewed with West allis to stop all pt's herbal supplements 1 week prior to surgery. OK for pt to take Tylenol PRN up to and including DOS with sip of water and to take inhalers. Instructed to NOT TAKE LASIX DOS. Verbalized understanding, questions/concerns denied.

## 2018-09-06 ENCOUNTER — OFFICE VISIT (OUTPATIENT)
Dept: FAMILY MEDICINE CLINIC | Age: 83
End: 2018-09-06

## 2018-09-06 VITALS
HEART RATE: 84 BPM | HEIGHT: 61 IN | DIASTOLIC BLOOD PRESSURE: 66 MMHG | SYSTOLIC BLOOD PRESSURE: 99 MMHG | OXYGEN SATURATION: 95 % | TEMPERATURE: 97.7 F | BODY MASS INDEX: 36.82 KG/M2 | RESPIRATION RATE: 20 BRPM | WEIGHT: 195 LBS

## 2018-09-06 DIAGNOSIS — Z01.818 PRE-OP EVALUATION: Primary | ICD-10-CM

## 2018-09-06 NOTE — PROGRESS NOTES
Chief Complaint   Patient presents with    Pre-op Exam     1. Have you been to the ER, urgent care clinic since your last visit? Hospitalized since your last visit? No    2. Have you seen or consulted any other health care providers outside of the [facility] since your last visit? Include any pap smears or colon screening.  No

## 2018-09-06 NOTE — PROGRESS NOTES
I saw and evaluated the patient, performing the key elements of the service. I discussed the findings, assessment and plan with the resident and agree with the resident's findings and plan as documented in the resident's note. Left carpal tunnel surgery.

## 2018-09-06 NOTE — PROGRESS NOTES
Preoperative Evaluation for Tommy Ruano     9/6/2018  Chief Complaint   Patient presents with    Pre-op Exam     HPI:   Tommy Ruano is a 80 y.o. female referred for evaluation by:Dr. Lalita Narvaez MD for Pre- Op Evaluation. Type of surgery and indication: Left wrist for carpal tunnel  Surgery site : Left wrist  Anesthesia type: Axillary Block Anesthesia  Date of procedure:  09/19/2018    Review of Systems   Review of Systems : negative unless highlighted  CONSTITUTIONAL: fevers. Chills. Anorexia. Weight loss. Weight gain. EYES: diplopia. Blurry vision. Visual changes  ENT: sinus congestion. Rhinorrhea. CARDIOVASCULAR: chest pain. palpitations  RESPIRATORY: dyspnea. Cough. Wheeze. GI: abdominal pain. Hemetochezia. Melana. : dysuria. Hematuria. Urgency. Itching. Burning. Discharge. NEURO: numbness. Tingling. Weakness in right hand. MUSCULOSKELETAL: arthralgias. Myalgias. Edema. SKIN: rash. Itching. Dryness. Flushing. PSYCH: anxiety. Depression. Irritability. Suicidal ideation. Homicidal ideation. Inherent Risk of Surgery   Surgical risk:  Intermediate    Patient Cardiac Risk Assessment   Risk Assessment using Revised Webb cardiac risk index (RCRI)  High-risk type of surgery (examples include vascular surgery and any open intraperitoneal or intrathoracic procedures): No  History of ischemic heart disease (i.e. MI or a positive exercise test, current complaint of chest pain, use of nitrate therapy, or ECG with pathological Q waves): No  History of HF: Yes, EF 55%   History of cerebrovascular disease: No  Diabetes mellitus requiring treatment with insulin: No  Preoperative serum creatinine >2.0 mg/dL (177 µmol/L): No    Assessment of Cardiac Functional Status   Functional assessment: limited, can not walk 2 blocks and up a flight of steps and functional capacity (< 4 METS).    Pt is cleared by cardiology as getting only local anesthesia    Other Risk Factors    Screening for ETOH use:  Occasional and low risk  Smoking status:  No     Personal or FH of bleeding problems:  No   Personal or FH of blood clots:  No  Personal or FH of anesthesia problems:  *no    Pulmonary Risk:   Asthma or COPD: Yes, Asthma  Obesity:  Body mass index is 36.84 kg/(m^2). Surgery close to diaphragm: No  Known AGUSTO: No  Albumin: pending  BUN pending     Past Medical, Surgical, Social History   Allergies  Latex allergy: No   Prior reactions to anesthesia:  None    Allergies   Allergen Reactions    Morphine Itching      Medications  Current Outpatient Prescriptions   Medication Sig    spironolactone (ALDACTONE) 25 mg tablet Take 1 Tab by mouth daily.  montelukast (SINGULAIR) 10 mg tablet Take 1 Tab by mouth daily.  simvastatin (ZOCOR) 20 mg tablet Take 1 Tab by mouth nightly.  cholecalciferol (VITAMIN D3) 1,000 unit tablet Take  by mouth daily.  melatonin tab tablet Take 5 mg by mouth nightly.  docusate sodium (STOOL SOFTENER PO) Take  by mouth as needed.  ferrous sulfate 325 mg (65 mg iron) tablet Take  by mouth Daily (before breakfast).  cephALEXin (KEFLEX) 500 mg capsule Take 500 mg by mouth four (4) times daily.  apixaban (ELIQUIS) 5 mg tablet Take 5 mg by mouth two (2) times a day.  furosemide (LASIX) 80 mg tablet Take 80 mg by mouth daily.  mirtazapine (REMERON) 15 mg tablet Take 15 mg by mouth nightly. 2- 15 mg    albuterol (PROVENTIL HFA, VENTOLIN HFA, PROAIR HFA) 90 mcg/actuation inhaler Take 2 Puffs by inhalation.  fluticasone (FLOVENT HFA) 220 mcg/actuation inhaler Take 2 Puffs by inhalation two (2) times a day.  metOLazone (ZAROXOLYN) 2.5 mg tablet Take 2.5 mg by mouth daily. For weight gain 3 lbs    multivitamin (ONE A DAY) tablet Take 1 Tab by mouth daily.  acetaminophen (TYLENOL) 325 mg tablet Take 500 mg by mouth every four (4) hours as needed for Pain.  Indications: Patient takes 1000mg in morning, 500mg middle of day, 1000mg nightly    calcium carbonate (OS-JUSTIN) 500 mg calcium (1,250 mg) tablet Take 1,200 Tabs by mouth daily.  acetaminophen-codeine (TYLENOL-CODEINE #3) 300-30 mg per tablet Take 1 Tab by mouth every four (4) hours as needed for Pain. No current facility-administered medications for this visit. Past Medical History   Past Medical History:   Diagnosis Date    Asthma     Atrial fibrillation (HCC)     Congestive heart failure (HCC)      Immunizations  Immunization History   Administered Date(s) Administered    Zoster Recombinant 08/16/2018       Objective     Vitals:    09/06/18 0905   BP: 99/66   Pulse: 84   Resp: 20   Temp: 97.7 °F (36.5 °C)   TempSrc: Oral   SpO2: 95%   Weight: 195 lb (88.5 kg)   Height: 5' 1\" (1.549 m)     Physical Exam  Constitutional:  Appears well,  No Acute Distress, Vitals noted  Psychiatric:  Affect normal, Alert and Oriented to person/place/time  Eyes: Pupils equally round and reactive, EOMI, conjunctiva clear, eyelids normal  ENT:  External ears and nose normal/lips, teeth OK/gums normal, TMs and Orophyarynx normal  Neck:  General inspection normal. No abnormal cervical or supraclavicular nodes  Lungs:  Clear to auscultation, good respiratory effort. Breath sounds equal. No wheezes. Heart: Ausculation normal.  Regular rhythm. No cardiac murmurs. No carotid bruits or palpable thrills. Chest wall normal  Abdomen - Soft, non tender. Non distended. Musculoskeletal - Normal ROM. Patient uses walker to ambulate. Neurological - No focal deficits. Speech normal.   Extremities:  Chronic lower extremity edema to the level of the calf. Good peripheral pulses  Skin:  Warm to palpation, without rashes, bruising, or suspicious lesions     EKG: last performed on 07/23/2018    Assessment and Plan   Preoperative Evaluation  · Hui Thomson is scheduled to have noncardiac surgery and has been evaluated for the risk of a cardiovascular perioperative cardiac event.  The estimated risk of an adverse cardiac event Negative. HENT: Negative. Eyes: Negative. Respiratory: Negative. Cardiovascular: Negative. Negative for chest pain, palpitations and leg swelling. Gastrointestinal:        BM ok  Colonoscopy due 2019    Endocrine: Negative. Genitourinary: Negative for dysuria and flank pain. Partial Hystrectomy    Musculoskeletal: Negative. Negative for back pain and gait problem. Skin: Negative. Negative for rash. Hx recurrent rash HSV   Neurological: Negative. Negative for dizziness, light-headedness and headaches. Hematological:        Dr Rock Rhoades Q 6 month   Psychiatric/Behavioral: Negative. Objective:   Physical Exam   Constitutional: She is oriented to person, place, and time. She appears well-developed and well-nourished. No distress. HENT:   Head: Normocephalic and atraumatic. Right Ear: External ear normal.   Left Ear: External ear normal.   Nose: Nose normal.   Mouth/Throat: No oropharyngeal exudate. Eyes: Conjunctivae and EOM are normal. Pupils are equal, round, and reactive to light. Neck: Normal range of motion. Neck supple. No thyromegaly present. Cardiovascular: Normal rate, regular rhythm, normal heart sounds and intact distal pulses. No murmur heard. Pulmonary/Chest: Effort normal and breath sounds normal.   Abdominal: Soft. Bowel sounds are normal.   Musculoskeletal: Normal range of motion. She exhibits no edema. Neurological: She is alert and oriented to person, place, and time. She has normal reflexes. Skin: Skin is warm and dry. She is not diaphoretic. Psychiatric: She has a normal mood and affect. Her behavior is normal. Judgment and thought content normal.   /86   Pulse 85   Wt 190 lb 3.2 oz (86.3 kg)   SpO2 98%   BMI 31.31 kg/m²       Assessment:      1. Essential hypertension  losartan (COZAAR) 50 MG tablet    Comprehensive Metabolic Panel    CBC with Differential   2.  Hypercholesterolemia  Comprehensive Metabolic Panel    Lipid Panel using the Revised Webb Cardiac Risk Index (RCRI) is 0.4%. There is no additional cardiovascular testing required as risk of adverse event including death is <1%. · Per ACC/AHA guidelines, patient is intermediate risk for an intermediate risk surgery and may proceed to planned surgery with the above noted risk. · Patient is counseled on holding Eliquis 3 days prior to surgery and holding Lasix day of surgery   · Orders placed today:   · CBC W/O DIFF  · BASIC METABOLIC PANEL    Follow-up Disposition:  Return As needed. I discussed the aforementioned diagnoses with the patient as well as the plan of care.    I discussed the patient with Dr. Everett Dacosta (Attending Physician)     Rubens Easley MD  Family Medicine Resident  PGY 1

## 2018-09-07 LAB
BUN SERPL-MCNC: 49 MG/DL (ref 8–27)
BUN/CREAT SERPL: 39 (ref 12–28)
CALCIUM SERPL-MCNC: 9.8 MG/DL (ref 8.7–10.3)
CHLORIDE SERPL-SCNC: 93 MMOL/L (ref 96–106)
CO2 SERPL-SCNC: 27 MMOL/L (ref 20–29)
CREAT SERPL-MCNC: 1.27 MG/DL (ref 0.57–1)
ERYTHROCYTE [DISTWIDTH] IN BLOOD BY AUTOMATED COUNT: 14.6 % (ref 12.3–15.4)
GLUCOSE SERPL-MCNC: 104 MG/DL (ref 65–99)
HCT VFR BLD AUTO: 42.2 % (ref 34–46.6)
HGB BLD-MCNC: 13.6 G/DL (ref 11.1–15.9)
INTERPRETATION: NORMAL
MCH RBC QN AUTO: 30.2 PG (ref 26.6–33)
MCHC RBC AUTO-ENTMCNC: 32.2 G/DL (ref 31.5–35.7)
MCV RBC AUTO: 94 FL (ref 79–97)
PLATELET # BLD AUTO: 226 X10E3/UL (ref 150–379)
POTASSIUM SERPL-SCNC: 3.9 MMOL/L (ref 3.5–5.2)
RBC # BLD AUTO: 4.5 X10E6/UL (ref 3.77–5.28)
SODIUM SERPL-SCNC: 142 MMOL/L (ref 134–144)
WBC # BLD AUTO: 6.2 X10E3/UL (ref 3.4–10.8)

## 2018-09-12 ENCOUNTER — TELEPHONE (OUTPATIENT)
Dept: FAMILY MEDICINE CLINIC | Age: 83
End: 2018-09-12

## 2018-09-12 NOTE — TELEPHONE ENCOUNTER
[facility] calling and notes that they didn't receive labs on patient from pre-op physical of 9/6/18.       Faxed to [phone]     [phone]      Narciso Bamberger mentioned in office notes

## 2018-09-13 NOTE — PROGRESS NOTES
Patient's CBC wnl. BMP unchanged from one month ago. Elevated BUN, Cr. GFR 39. Labs requested by Ortho prior to carpal tunnel surgery 09/19. I will send these results to the patient by mail.

## 2018-09-17 NOTE — TELEPHONE ENCOUNTER
Patient needs a refill of the following  Requested Prescriptions     Pending Prescriptions Disp Refills    furosemide (LASIX) 80 mg tablet       Sig: Take 1 Tab by mouth daily.  mirtazapine (REMERON) 15 mg tablet       Sig: Take 1 Tab by mouth nightly.  2- 15 mg

## 2018-09-18 RX ORDER — MIRTAZAPINE 15 MG/1
15 TABLET, FILM COATED ORAL
Qty: 90 TAB | Refills: 1 | Status: SHIPPED | OUTPATIENT
Start: 2018-09-18 | End: 2018-09-25 | Stop reason: SDUPTHER

## 2018-09-18 RX ORDER — FUROSEMIDE 80 MG/1
80 TABLET ORAL DAILY
Qty: 90 TAB | Refills: 1 | Status: SHIPPED | OUTPATIENT
Start: 2018-09-18 | End: 2019-02-08 | Stop reason: SDUPTHER

## 2018-09-19 ENCOUNTER — TELEPHONE (OUTPATIENT)
Dept: FAMILY MEDICINE CLINIC | Age: 83
End: 2018-09-19

## 2018-09-19 NOTE — TELEPHONE ENCOUNTER
Per fax from pharmacy, they need clarification on the directions and quantity of the Remeron.  Please advise

## 2018-09-19 NOTE — TELEPHONE ENCOUNTER
Patient needs a refill of the following  Requested Prescriptions     Pending Prescriptions Disp Refills    apixaban (ELIQUIS) 5 mg tablet       Sig: Take 1 Tab by mouth two (2) times a day.

## 2018-09-25 ENCOUNTER — ANESTHESIA EVENT (OUTPATIENT)
Dept: SURGERY | Age: 83
End: 2018-09-25
Payer: MEDICARE

## 2018-09-25 RX ORDER — MIRTAZAPINE 15 MG/1
30 TABLET, FILM COATED ORAL
Qty: 180 TAB | Refills: 1 | Status: SHIPPED | OUTPATIENT
Start: 2018-09-25 | End: 2019-01-15 | Stop reason: SDUPTHER

## 2018-09-26 ENCOUNTER — HOSPITAL ENCOUNTER (OUTPATIENT)
Age: 83
Setting detail: OUTPATIENT SURGERY
Discharge: HOME | End: 2018-09-26
Attending: ORTHOPAEDIC SURGERY | Admitting: ORTHOPAEDIC SURGERY
Payer: MEDICARE

## 2018-09-26 ENCOUNTER — ANESTHESIA (OUTPATIENT)
Dept: SURGERY | Age: 83
End: 2018-09-26
Payer: MEDICARE

## 2018-09-26 VITALS
HEIGHT: 61 IN | WEIGHT: 198.19 LBS | OXYGEN SATURATION: 96 % | TEMPERATURE: 97.8 F | SYSTOLIC BLOOD PRESSURE: 110 MMHG | BODY MASS INDEX: 37.42 KG/M2 | DIASTOLIC BLOOD PRESSURE: 69 MMHG | RESPIRATION RATE: 21 BRPM | HEART RATE: 81 BPM

## 2018-09-26 DIAGNOSIS — G56.02 CARPAL TUNNEL SYNDROME ON LEFT: Primary | ICD-10-CM

## 2018-09-26 PROCEDURE — 74011250636 HC RX REV CODE- 250/636: Performed by: ORTHOPAEDIC SURGERY

## 2018-09-26 PROCEDURE — 76030000000 HC AMB SURG OR TIME 0.5 TO 1: Performed by: ORTHOPAEDIC SURGERY

## 2018-09-26 PROCEDURE — 77030020782 HC GWN BAIR PAWS FLX 3M -B

## 2018-09-26 PROCEDURE — 77030006689 HC BLD OPHTH BVR BD -A: Performed by: ORTHOPAEDIC SURGERY

## 2018-09-26 PROCEDURE — 74011000250 HC RX REV CODE- 250

## 2018-09-26 PROCEDURE — A4565 SLINGS: HCPCS

## 2018-09-26 PROCEDURE — 74011250636 HC RX REV CODE- 250/636

## 2018-09-26 PROCEDURE — 76210000046 HC AMBSU PH II REC FIRST 0.5 HR: Performed by: ORTHOPAEDIC SURGERY

## 2018-09-26 PROCEDURE — 74011000250 HC RX REV CODE- 250: Performed by: ORTHOPAEDIC SURGERY

## 2018-09-26 PROCEDURE — 74011000272 HC RX REV CODE- 272: Performed by: ORTHOPAEDIC SURGERY

## 2018-09-26 PROCEDURE — 77030018836 HC SOL IRR NACL ICUM -A: Performed by: ORTHOPAEDIC SURGERY

## 2018-09-26 PROCEDURE — 74011250636 HC RX REV CODE- 250/636: Performed by: ANESTHESIOLOGY

## 2018-09-26 PROCEDURE — 76060000061 HC AMB SURG ANES 0.5 TO 1 HR: Performed by: ORTHOPAEDIC SURGERY

## 2018-09-26 PROCEDURE — 77030002986 HC SUT PROL J&J -A: Performed by: ORTHOPAEDIC SURGERY

## 2018-09-26 PROCEDURE — 77030003601 HC NDL NRV BLK BBMI -A

## 2018-09-26 PROCEDURE — 77030006602 HC BLD CRPL AGEE MCRA -C: Performed by: ORTHOPAEDIC SURGERY

## 2018-09-26 PROCEDURE — 77030000032 HC CUF TRNQT ZIMM -B: Performed by: ORTHOPAEDIC SURGERY

## 2018-09-26 RX ORDER — EPHEDRINE SULFATE 50 MG/ML
INJECTION, SOLUTION INTRAVENOUS AS NEEDED
Status: DISCONTINUED | OUTPATIENT
Start: 2018-09-26 | End: 2018-09-26 | Stop reason: HOSPADM

## 2018-09-26 RX ORDER — LIDOCAINE HYDROCHLORIDE 10 MG/ML
0.1 INJECTION, SOLUTION EPIDURAL; INFILTRATION; INTRACAUDAL; PERINEURAL AS NEEDED
Status: DISCONTINUED | OUTPATIENT
Start: 2018-09-26 | End: 2018-09-26 | Stop reason: HOSPADM

## 2018-09-26 RX ORDER — PROPOFOL 10 MG/ML
INJECTION, EMULSION INTRAVENOUS
Status: DISCONTINUED | OUTPATIENT
Start: 2018-09-26 | End: 2018-09-26 | Stop reason: HOSPADM

## 2018-09-26 RX ORDER — PROPOFOL 10 MG/ML
INJECTION, EMULSION INTRAVENOUS AS NEEDED
Status: DISCONTINUED | OUTPATIENT
Start: 2018-09-26 | End: 2018-09-26 | Stop reason: HOSPADM

## 2018-09-26 RX ORDER — SODIUM CHLORIDE, SODIUM LACTATE, POTASSIUM CHLORIDE, CALCIUM CHLORIDE 600; 310; 30; 20 MG/100ML; MG/100ML; MG/100ML; MG/100ML
125 INJECTION, SOLUTION INTRAVENOUS CONTINUOUS
Status: DISCONTINUED | OUTPATIENT
Start: 2018-09-26 | End: 2018-09-26 | Stop reason: HOSPADM

## 2018-09-26 RX ORDER — CEFAZOLIN SODIUM/WATER 2 G/20 ML
2 SYRINGE (ML) INTRAVENOUS ONCE
Status: COMPLETED | OUTPATIENT
Start: 2018-09-26 | End: 2018-09-26

## 2018-09-26 RX ORDER — ONDANSETRON 2 MG/ML
4 INJECTION INTRAMUSCULAR; INTRAVENOUS AS NEEDED
Status: DISCONTINUED | OUTPATIENT
Start: 2018-09-26 | End: 2018-09-26 | Stop reason: HOSPADM

## 2018-09-26 RX ORDER — SODIUM CHLORIDE 0.9 % (FLUSH) 0.9 %
5-10 SYRINGE (ML) INJECTION EVERY 8 HOURS
Status: DISCONTINUED | OUTPATIENT
Start: 2018-09-26 | End: 2018-09-26 | Stop reason: HOSPADM

## 2018-09-26 RX ORDER — DIPHENHYDRAMINE HYDROCHLORIDE 50 MG/ML
12.5 INJECTION, SOLUTION INTRAMUSCULAR; INTRAVENOUS AS NEEDED
Status: DISCONTINUED | OUTPATIENT
Start: 2018-09-26 | End: 2018-09-26 | Stop reason: HOSPADM

## 2018-09-26 RX ORDER — FENTANYL CITRATE 50 UG/ML
INJECTION, SOLUTION INTRAMUSCULAR; INTRAVENOUS AS NEEDED
Status: DISCONTINUED | OUTPATIENT
Start: 2018-09-26 | End: 2018-09-26 | Stop reason: HOSPADM

## 2018-09-26 RX ORDER — SODIUM CHLORIDE 0.9 % (FLUSH) 0.9 %
5-10 SYRINGE (ML) INJECTION AS NEEDED
Status: DISCONTINUED | OUTPATIENT
Start: 2018-09-26 | End: 2018-09-26 | Stop reason: HOSPADM

## 2018-09-26 RX ORDER — HYDROCODONE BITARTRATE AND ACETAMINOPHEN 5; 325 MG/1; MG/1
TABLET ORAL
Qty: 20 TAB | Refills: 0 | Status: SHIPPED
Start: 2018-09-26 | End: 2018-11-16

## 2018-09-26 RX ORDER — SODIUM CHLORIDE, SODIUM LACTATE, POTASSIUM CHLORIDE, CALCIUM CHLORIDE 600; 310; 30; 20 MG/100ML; MG/100ML; MG/100ML; MG/100ML
100 INJECTION, SOLUTION INTRAVENOUS CONTINUOUS
Status: DISCONTINUED | OUTPATIENT
Start: 2018-09-26 | End: 2018-09-26 | Stop reason: HOSPADM

## 2018-09-26 RX ORDER — MIDAZOLAM HYDROCHLORIDE 1 MG/ML
INJECTION, SOLUTION INTRAMUSCULAR; INTRAVENOUS AS NEEDED
Status: DISCONTINUED | OUTPATIENT
Start: 2018-09-26 | End: 2018-09-26 | Stop reason: HOSPADM

## 2018-09-26 RX ORDER — HYDROMORPHONE HYDROCHLORIDE 1 MG/ML
.25-1 INJECTION, SOLUTION INTRAMUSCULAR; INTRAVENOUS; SUBCUTANEOUS
Status: DISCONTINUED | OUTPATIENT
Start: 2018-09-26 | End: 2018-09-26 | Stop reason: HOSPADM

## 2018-09-26 RX ADMIN — PROPOFOL 20 MG: 10 INJECTION, EMULSION INTRAVENOUS at 12:58

## 2018-09-26 RX ADMIN — EPHEDRINE SULFATE 10 MG: 50 INJECTION, SOLUTION INTRAVENOUS at 12:46

## 2018-09-26 RX ADMIN — PROPOFOL 50 MCG/KG/MIN: 10 INJECTION, EMULSION INTRAVENOUS at 12:42

## 2018-09-26 RX ADMIN — Medication 2 G: at 12:50

## 2018-09-26 RX ADMIN — FENTANYL CITRATE 50 MCG: 50 INJECTION, SOLUTION INTRAMUSCULAR; INTRAVENOUS at 11:45

## 2018-09-26 RX ADMIN — FENTANYL CITRATE 50 MCG: 50 INJECTION, SOLUTION INTRAMUSCULAR; INTRAVENOUS at 12:42

## 2018-09-26 RX ADMIN — MIDAZOLAM HYDROCHLORIDE 2 MG: 1 INJECTION, SOLUTION INTRAMUSCULAR; INTRAVENOUS at 12:42

## 2018-09-26 RX ADMIN — SODIUM CHLORIDE, SODIUM LACTATE, POTASSIUM CHLORIDE, AND CALCIUM CHLORIDE 100 ML/HR: 600; 310; 30; 20 INJECTION, SOLUTION INTRAVENOUS at 11:06

## 2018-09-26 RX ADMIN — MIDAZOLAM HYDROCHLORIDE 2 MG: 1 INJECTION, SOLUTION INTRAMUSCULAR; INTRAVENOUS at 11:45

## 2018-09-26 RX ADMIN — PROPOFOL 40 MG: 10 INJECTION, EMULSION INTRAVENOUS at 12:42

## 2018-09-26 NOTE — PROGRESS NOTES
POST ANESTHESIA CARE DISCHARGE / TRANSFER NOTE Miya Davis was   discharged     via     wheel chair     to       private vehicle    . Patient was escorted by     nurse  . Patient verbalized   appreciation and was very pleased with care received    throughout their stay. Patient was discharged in     pleasant mood     . Pain at discharge/transfer was     0/10. Discharge, medication and follow-up instructions were verbalized as understood prior to discharge  (if applicable for same-day procedures being discharged.) All personal belongings have been returned to patient, and patient/family verbally confirm receiving belongings as all present.  
 
Signed: Lianna BENAVIDESN RN-BC

## 2018-09-26 NOTE — DISCHARGE INSTRUCTIONS
Beatrice Burn    MD Dr. Dahiana Coleman MD Dr. Carlos Leriche. Bruna Zapata MD    You have undergone surgery by one of our hand specialists. Please follow these instructions to ensure a safe and speedy recovery. 1. SURGICAL DRESSING (bandage): Your bandage should be kept dry and in place until you return to the office for your follow up visit. This helps to guard against infection. [x]         You can shower if you place a plastic bag over your dressing.    []         You will need to sponge bathe until you are seen in the office. 2. ELEVATION:  It is VERY IMPORTANT that you keep your arm and hand above the level of your heart at all times, awake or asleep. The higher you elevate your hand, the less it will swell, and the less it will hurt. It is best to elevate the hand as shown below:              Laying down, especially at night,  with your arm elevated on pillows help keep your shoulder and elbow from getting stiff. 3. Ice bags / ice packs can be used to help control pain. If you make your own ice bag, double-bagging helps to prevent leaks. 4. MEDICATIONS:  You have been given a prescription for pain medications. Take it according to the instructions on the bottle. DO NOT drink alcohol while taking pain medications. DO NOT drive, operate heavy machinery, or make important personal or business decisions since the medications will make you drowsy. We do NOT refill prescriptions over the weekend. Please arrange to call for prescription refills during regular office hours. 5. APPOINTMENT:  Your post operative appointment has been made for the following time:    TIME:   1:00pm          DATE:  10/8/18         At the:      [x]  [place] Office    6. AFTER GENERAL ANESTHESIA or IV SEDATION:   DO NOT drink alcohol or drive, work around [facility] 24, or make important personal or business decisions. Limit your activity for 24 hours.   Resume your diet with light foods (Jell-o ®, clear soups, clear fluids, caffeine-free drinks). If you do not have any nausea, you may resume your regular diet. We at [address] [place] [address] want your surgery and recovery to be as comfortable and successful as possible. Should you have problems, please call our office during the morning hours. In particular, call if your pain is not adequately controlled by prescribed medication, temperature is over 101 degrees, or your bandage is wet or has a four odor. Your doctor contact information:   [facility] [phone]  Zakia Merchant, ext [phone] [facility] - [address] [facility], [address]  [place], [address] from Your Nurse    PATIENT INSTRUCTIONS:    AFTER ANESTHESIA & SEDATION, and WHILE TAKING PAIN MEDICINE  After general anesthesia / intravenous sedation and the 24 hours following, and/or while taking prescription Opiates:  · Limit your activities  · Do not drive and operate hazardous machinery until you have been of all narcotics and sedatives for over 24 hours  · Do not make important personal or business decisions  · Do  not drink alcoholic beverages  · If you have not urinated within 8 hours after discharge, please contact your surgeon on call. SIGNS OF INFECTION  Report the following Signs of Infection or General Problems after surgery to your surgeon:  · Excessive pain, swelling, redness or odor of or around the surgical area  · Temperature over 101; Temperature over 100 if on medications that affect your ability to fight infections  · Nausea and vomiting lasting longer than 4 hours or if unable to take medications  · Any signs of decreased circulation or nerve impairment to extremity: change in color, persistent  numbness, tingling, coldness or increase pain  · If you have any questions.       COUGH AND DEEP BREATHE  Breathing deep and coughing are very important exercises to do after surgery. Deep breathing and coughing open the little air tubes and air sacks in your lungs. You take deep breaths every day. You may not even notice - it is just something you do when you sigh or yawn. It is a natural exercise you do to keep these air passages open. After surgery, take deep breaths and cough, on purpose. Coughing and deep breathing help prevent bronchitis and pneumonia after surgery. If you had chest or belly surgery, use a pillow as a \"hug maria eugenia\" and hold it tightly to your chest or belly when you cough. DIRECTIONS:  1. Take 10 to 15 slow deep breaths every hour while awake. 2. Breathe in deeply, and hold it for 2 seconds. 3. Exhale slowly through puckered lips, like blowing up a balloon. 4. After every 4th or 5th deep breath, hug your pillow to your chest or belly and give a hard, deep cough. Yes, it will probably hurt. But doing this exercise is very important part of healing after surgery. Take your pain medicine to help you do this exercise without too much pain. ANKLE PUMPS    Ankle pumps increase the circulation of oxygenated blood to your lower extremities and decrease your risk for circulation problems such as blood clots. They also stretch the muscles, tendons and ligaments in your foot and ankle, and prevent joint contracture in the ankle and foot, especially after surgeries on the legs. It is important to do ankle pump exercises regularly after surgery because immobility increases your risk for developing a blood clot. Your doctor may also have you take an Aspirin for the next few days as well. If your doctor did not ask you to take an Aspirin, consult with him before starting Aspirin therapy on your own. The exercise is quite simple. · Slowly point your foot forward, feeling the muscles on the top of your lower leg stretch, and hold this position for 5 seconds.                   · Next, pull your foot back toward you as far as possible, stretching the calf muscles, and hold that position for 5 seconds. · Repeat with the other foot. · Perform 10 repetitions every hour while awake for both ankles if possible (down and then up with the foot once is one repetition). You should feel gentle stretching of the muscles in your lower leg when doing this exercise. If you feel pain, or your range of motion is limited, don't push too hard. Only go the limit your joint and muscles will let you go. If you have increasing pain, progressively worsening leg warmth or swelling, STOP the exercise and call your doctor. Other Wound Care information:  [x] No additional recommendations. Below is information on the medication(s) your doctor is prescribing for you: The maximum daily dose of acetaminophen was discussed with the patient. She was encouraged not to exceed 3,000 mg of acetaminophen during a 24 hour period and was asked to keep in mind that acetaminophen can also be found in many over-the-counter cold medications as well as narcotics that may be given for pain. The patient expresses understanding of these issues and questions were answered. 4 THINGS ABOUT PAIN MEDICINE I ALWAYS TALK ABOUT:  There are 4 side effects I always talk about for pain medications. 1. They make you sleepy and drowsy. Do not drive a car or operate machines while taking pain medication. Do not make any major decisions. Take a nap. Relax. Let your body recover from the affects of anesthesia and surgery. 2. Some people have quite a problem with itching and. ..  3. Nausea or vomiting. I mention these together because research tends to suggest there is a gene-related issue. While some have a hard time with these problems, others do not. These are expected and know side effects. Over-the-counter Benadryl® (the drug name is diphenhydramine) can help with the itching.   Your doctor may also have given you some medicine for nausea. IF HE DID NOT, CALL HIM/HER. 4. Last but not least is the problem of constipation (not lauri able to have a bowel movement - poop.)  All pain medicine can slow down the movement of food through the gut. The slower it goes, the worse it can be. Frankly, this means hard poop adding insult to the injury of surgery. And if you had tummy surgery, like having your gall bladder removed or a hernia repair, YOU DO NOT WANT THIS PROBLEM. There are 4 things I recommend. · Drink lots of fluids. For healthy people with no heart problems, this means at least 64 ounces of liquids or more per day. For example, a Big Gulp® from [employer] is 32 ounces. So you need to drink at least 2  Big Gulp®'s of fluids every day. If you have heart problems you may not be able to do this. Talk to your doctor about what you should do to prevent constipation. · Drinking fruit juice like apple, pear, or prune juice gives you extra \"BANG\" for your beverage. These drinks are high in natural fiber. If you are a diabetic, drink sugar-free fluids with fiber additives (see next 2 points.)  Avoid drinking extra fruit juice unless this is a regular part of your diet plan. · Eat extra fresh fruits and vegetables. · Add extra fiber-products. Fiber products like Metamucil®, Citrucel®, Miralax® or Benefiber® can help. These products are over-the-counter and you do not need a prescription from your doctor. If you have followed these recommendations and still have some difficulty having a good poop, take and over-the-counter stimulant like Dulcolax® (biscodyl)  or Senokot® (senna concentrate). These may help get things moving. Bobby Gant MEDICATION AND   SIDE EFFECT GUIDE    The [facility] MEDICATION AND SIDE EFFECT GUIDE was provided to the PATIENT AND CARE PROVIDER.   Information provided includes instruction about drug purpose and common side effects for the following medications:   · Hydrocodone/Acetaminophen 5/325mg - for Pain        Medication information added to discharge record on September 26, 2018 at 1:03 PM.      Some information we wish all of our patients to be familiar with and General Information for Healthy Lifestyle choices:    · Make a list of your current medications with your Primary Care Provider. · Update this list whenever your medications are discontinued, doses are changed, or new medications (including over-the-counter products like ibuprofen, vitamins, or herbal remedies) are added. · Carry medication information at all times in case of emergency situations      No smoking / No tobacco products / Avoid exposure to second hand smoke    Surgeon General's Warning:  Quitting smoking now greatly reduces serious risk to your health. Obesity, smoking, and sedentary lifestyle greatly increases your risk for illness. A healthy diet, regular physical exercise & weight monitoring are important for maintaining a healthy lifestyle. A Note About Congestive Heart Failure: You may be retaining fluid if you have a history of heart failure or if you experience any of the following symptoms:      · Weight gain of 3 pounds or more overnight or 5 pounds in a week  · Increased swelling in our hands or feet  · Shortness of breath while lying flat in bed      Please call your doctor as soon as you notice any of these symptoms; do not wait until your next office visit. A Note About Strokes:  Recognize signs and symptoms of STROKE. The simple mnemonic, F.A.S.T., can help you remember signs of a stroke and what to do if you suspect a stroke is occuring to you or someone you are with:    F - Face looks uneven  A - Arms unable to move, or move evenly  S - Speech is slurred or non-existent  T - Time - CALL 911 as soon as signs and symptoms begin - DO NOT go to bed or wait to see if you get better - TIME IS BRAIN.     Warning Signs of HEART ATTACK   Call 911 if you have these symptoms:     Chest discomfort. Most heart attacks involve discomfort in the center of the chest that lasts more than a few minutes, or that goes away and comes back. It can feel like uncomfortable pressure, squeezing, fullness, or pain.  Discomfort in other areas of the upper body. Symptoms can include pain or discomfort in one or both arms, the back, neck, jaw, or stomach.  Shortness of breath with or without chest discomfort.  Other signs may include breaking out in a cold sweat, nausea, or lightheadedness. Don't wait more than five minutes to call 911 - MINUTES MATTER! Fast action can save your life. Calling 911 is almost always the fastest way to get lifesaving treatment. Emergency Medical Services staff can begin treatment when they arrive -- up to an hour sooner than if someone gets to the hospital by car. AT THE COMPLETION OF DISCHARGE INSTRUCTION REVIEW, WE VERIFY:  The discharge information has been reviewed with the patient and guardian. Questions have been asked and answered meeting caregiver and guardian expectations. The caregiver verbalized understanding. Your discharge nurse was Jamar CARTER, RN-BC       Board Certified - Pain Management      Other information found in your discharge envelope:  [x]     PRESCRIPTIONS     [] Sent electronically to your pharmacy  []     PHYSICAL THERAPY PRESCRIPTION  []     APPOINTMENT CARDS  []     Regional Anesthesia Pamphlet for block or block with On-Q Catheter from Anesthesia  Service  []     Medical device information sheets/pamphlets from their    []     School/work excuse note. []     /parent work excuse note. The following personal items collected during your admission for safe keeping are returned to you:     Dental Appliance: Dental Appliances: Lowers, Uppers  Vi kasey: Visual Aid: Glasses  Hearing Aid:    Jewelry: Jewelry: None  Clothing: Clothing:  Footwear, Pants, Shirt, Undergarments  Other Valuables: Other Valuables: Eyeglasses  Valuables sent to safe: Personal Items Sent to Safe: N/A

## 2018-09-26 NOTE — ANESTHESIA PREPROCEDURE EVALUATION
Anesthetic History No history of anesthetic complications Review of Systems / Medical History Patient summary reviewed, nursing notes reviewed and pertinent labs reviewed Pulmonary Asthma Neuro/Psych Within defined limits Cardiovascular CHF Dysrhythmias : atrial fibrillation Exercise tolerance: <4 METS Comments: Not on beta blocker 
 
apixaban (ELIQUIS) 5 mg tablet last 9/22/2018 GI/Hepatic/Renal 
  
 
 
Renal disease: CRI Endo/Other Morbid obesity and arthritis Other Findings Comments: Gastric bypass 1979 Retired RN Back pain Physical Exam 
 
Airway Mallampati: II 
TM Distance: 4 - 6 cm Neck ROM: normal range of motion Mouth opening: Normal 
 
 Cardiovascular Regular rate and rhythm,  S1 and S2 normal,  no murmur, click, rub, or gallop Rhythm: regular Rate: normal 
 
 
 
 Dental 
 
Dentition: Full upper dentures Pulmonary Breath sounds clear to auscultation Abdominal 
GI exam deferred Other Findings Anesthetic Plan ASA: 3 Anesthesia type: regional - supraclavicular block Anesthetic plan and risks discussed with: Patient

## 2018-09-26 NOTE — ANESTHESIA POSTPROCEDURE EVALUATION
Post-Anesthesia Evaluation and Assessment Patient: Val Jernigan MRN: [id number]  SSN: [id number] YOB: 1934  Age: 80 y.o. Sex: female Cardiovascular Function/Vital Signs Visit Vitals  /69  Pulse 81  Temp 36.6 °C (97.8 °F)  Resp 21  
 Ht 5' 1\" (1.549 m)  Wt 89.9 kg (198 lb 3.1 oz)  SpO2 96%  BMI 37.45 kg/m2 Patient is status post regional, MAC anesthesia for Procedure(s): LEFT ENDOSCOPIC CARPAL TUNNEL RELEASE (AXILLARY BLOCK) . Nausea/Vomiting: None Postoperative hydration reviewed and adequate. Pain: 
Pain Scale 1: Numeric (0 - 10) (09/26/18 1340) Pain Intensity 1: 0 (09/26/18 1340) Managed Neurological Status:  
Neuro (WDL): Within Defined Limits (09/26/18 1340) Neuro Neurologic State: Drowsy; Appropriate for age (09/26/18 1310) LUE Motor Response: Numbness; Pharmacologically paralyzed (09/26/18 1340) At baseline Mental Status and Level of Consciousness: Arousable Pulmonary Status:  
O2 Device: Room air (09/26/18 1310) Adequate oxygenation and airway patent Complications related to anesthesia: None Post-anesthesia assessment completed. No concerns Signed By: Carmencita Victoria MD   
 September 26, 2018

## 2018-09-26 NOTE — H&P
Date of Surgery Update: 
Gray Agrawal was seen and examined. History and physical has been reviewed. The patient has been examined. There have been no significant clinical changes since the completion of the originally dated History and Physical. 
 
Signed By: ALIE Vazquez  September 26, 2018 12:31 PM

## 2018-09-26 NOTE — BRIEF OP NOTE
BRIEF OPERATIVE NOTE Date of Procedure: 9/26/2018 Preoperative Diagnosis: LEFT CARPAL TUNNEL SYNDROME Postoperative Diagnosis: LEFT CARPAL TUNNEL SYNDROME Procedure(s): LEFT ENDOSCOPIC CARPAL TUNNEL RELEASE (AXILLARY BLOCK) Surgeon(s) and Role: Thais Trivedi MD - Primary Surgical Assistant: none Surgical Staff: 
Circ-1: Merlinda Hof, RN Physician Assistant: ALIE Mazariegos Scrub RN-1: Elia Amaya RN Event Time In Incision Start 8923 Incision Close 1306 Anesthesia: Other Estimated Blood Loss: none Specimens: * No specimens in log * Findings: compression of left carpal canal   
Complications: none Implants: * No implants in log *

## 2018-09-26 NOTE — IP AVS SNAPSHOT
[address] [facility] 
 
 
 [address] 
[phone] Patient: Katy Ahuja MRN: [id number] QER:[id number] About your hospitalization You were admitted on:  September 26, 2018 You last received care in the:  [facility] PACU You were discharged on:  September 26, 2018 Why you were hospitalized Your primary diagnosis was:  Not on File Follow-up Information Follow up With Details Comments Contact Info Geeta [place], [address] 
[phone] Discharge Orders None A check ana indicates which time of day the medication should be taken. My Medications START taking these medications Instructions Each Dose to Equal  
 Morning Noon Evening Bedtime HYDROcodone-acetaminophen 5-325 mg per tablet Commonly known as:  NORCO  
   
 1 tab PO Q 4-6 hrs PRN after food CONTINUE taking these medications Instructions Each Dose to Equal  
 Morning Noon Evening Bedtime  
 albuterol 90 mcg/actuation inhaler Commonly known as:  PROVENTIL HFA, VENTOLIN HFA, PROAIR HFA Your last dose was:  9/26 Your next dose is:  As nedeed Take 2 Puffs by inhalation. 2 Puff  
    
   
   
   
  
 apixaban 5 mg tablet Commonly known as:  Raúl Stade Your last dose was:  9/26 Your next dose is:  9/26 Take 1 Tab by mouth two (2) times a day. 5 mg  
    
   
   
  
   
  
 calcium carbonate 500 mg calcium (1,250 mg) tablet Commonly known as:  OS-JUSTIN Your last dose was:  9/19 Your next dose is:  9/27 Take 1,200 Tabs by mouth daily. 1200 Tab  
    
   
   
   
  
 ferrous sulfate 325 mg (65 mg iron) tablet Your last dose was:  9/19 Your next dose is:  9/27 Take  by mouth Daily (before breakfast). FLOVENT  mcg/actuation inhaler Generic drug:  fluticasone Your last dose was:  9/26 Your next dose is:  9/26 Take 2 Puffs by inhalation two (2) times a day. 2 Puff  
    
   
   
  
   
  
 furosemide 80 mg tablet Commonly known as:  LASIX Your last dose was:  9/25 Your next dose is:  9/27 Take 1 Tab by mouth daily. 80 mg  
    
   
   
   
  
 melatonin Tab tablet Your last dose was:  9/19 Your next dose is:  9/26 Take 5 mg by mouth nightly. 5 mg  
    
   
   
   
  
  
 mirtazapine 15 mg tablet Commonly known as:  Sherrie Ivan Your last dose was:  9/25 Your next dose is:  9/26 Take 2 Tabs by mouth nightly. 30 mg  
    
   
   
   
  
  
 montelukast 10 mg tablet Commonly known as:  SINGULAIR Your last dose was:  9/25 Your next dose is:  9/26 Take 1 Tab by mouth daily. 10 mg  
    
   
   
  
   
  
 multivitamin tablet Commonly known as:  ONE A DAY Your last dose was:  9/19 Your next dose is:  9/27 Take 1 Tab by mouth daily. 1 Tab  
    
   
   
   
  
 simvastatin 20 mg tablet Commonly known as:  ZOCOR Your last dose was:  9/25 Your next dose is:  9/26 Take 1 Tab by mouth nightly. 20 mg  
    
   
   
   
  
  
 spironolactone 25 mg tablet Commonly known as:  ALDACTONE Your last dose was:  9/25 Your next dose is:  9/26 Take 1 Tab by mouth daily. 25 mg STOOL SOFTENER PO Your last dose was:  9/26 Your next dose is:  9/27 Take  by mouth as needed. VITAMIN D3 1,000 unit tablet Generic drug:  cholecalciferol Your last dose was:  9/19 Your next dose is:  6/27 Take  by mouth daily. ZAROXOLYN 2.5 mg tablet Generic drug:  metOLazone Your last dose was:  9/21 Your next dose is:  As needed Take 2.5 mg by mouth daily. For weight gain 3 lbs  
 2.5 mg  
    
   
   
   
  
  
STOP taking these medications TYLENOL 325 mg tablet Generic drug:  acetaminophen TYLENOL-CODEINE #3 300-30 mg per tablet Generic drug:  acetaminophen-codeine Where to Get Your Medications Information on where to get these meds will be given to you by the nurse or doctor. ! Ask your nurse or doctor about these medications HYDROcodone-acetaminophen 5-325 mg per tablet Opioid Education Prescription Opioids: What You Need to Know: 
 
 
You have undergone surgery by one of our hand specialists. Please follow these instructions to ensure a safe and speedy recovery. 1. SURGICAL DRESSING (bandage): Your bandage should be kept dry and in place until you return to the office for your follow up visit. This helps to guard against infection. [x]         You can shower if you place a plastic bag over your dressing. 
  []         You will need to sponge bathe until you are seen in the office. 2. ELEVATION:  It is VERY IMPORTANT that you keep your arm and hand above the level of your heart at all times, awake or asleep. The higher you elevate your hand, the less it will swell, and the less it will hurt. It is best to elevate the hand as shown below: 
 
      
  
Laying down, especially at night,  with your arm elevated on pillows help keep your shoulder and elbow from getting stiff. 3. Ice bags / ice packs can be used to help control pain. If you make your own ice bag, double-bagging helps to prevent leaks. 4. MEDICATIONS:  You have been given a prescription for pain medications. Take it according to the instructions on the bottle. DO NOT drink alcohol while taking pain medications. DO NOT drive, operate heavy machinery, or make important personal or business decisions since the medications will make you drowsy. We do NOT refill prescriptions over the weekend. Please arrange to call for prescription refills during regular office hours. 5. APPOINTMENT:  Your post operative appointment has been made for the following time: 
 
TIME:   1:00pm          DATE:  10/8/18         At the:      []  [facility] Office 6. AFTER GENERAL ANESTHESIA or IV SEDATION:   DO NOT drink alcohol or drive, work around [facility], or make important personal or business decisions. Limit your activity for 24 hours. Resume your diet with light foods (Jell-o ®, clear soups, clear fluids, caffeine-free drinks). If you do not have any nausea, you may resume your regular diet. We at [address] [place] [address] want your surgery and recovery to be as comfortable and successful as possible. Should you have problems, please call our office during the morning hours. In particular, call if your pain is not adequately controlled by prescribed medication, temperature is over 101 degrees, or your bandage is wet or has a four odor. Your doctor contact information:  
? [phone] 
? [phone], ext [phone] [facility] OFFICE - [address] [place] [address] OFFICE - [address] [facility]. [address]  [address] DISCHARGE SUMMARY from Your Nurse PATIENT INSTRUCTIONS: 
 
AFTER ANESTHESIA & SEDATION, and WHILE TAKING PAIN MEDICINE After general anesthesia / intravenous sedation and the 24 hours following, and/or while taking prescription Opiates: · Limit your activities · Do not drive and operate hazardous machinery until you have been of all narcotics and sedatives for over 24 hours · Do not make important personal or business decisions · Do  not drink alcoholic beverages · If you have not urinated within 8 hours after discharge, please contact your surgeon on call. SIGNS OF INFECTION Report the following Signs of Infection or General Problems after surgery to your surgeon: 
· Excessive pain, swelling, redness or odor of or around the surgical area · Temperature over 101; Temperature over 100 if on medications that affect your ability to fight infections · Nausea and vomiting lasting longer than 4 hours or if unable to take medications · Any signs of decreased circulation or nerve impairment to extremity: change in color, persistent  numbness, tingling, coldness or increase pain · If you have any questions. [address] Breathing deep and coughing are very important exercises to do after surgery. Deep breathing and coughing open the little air tubes and air sacks in your lungs. You take deep breaths every day. You may not even notice - it is just something you do when you sigh or yawn. It is a natural exercise you do to keep these air passages open. After surgery, take deep breaths and cough, on purpose. Coughing and deep breathing help prevent bronchitis and pneumonia after surgery. If you had chest or belly surgery, use a pillow as a \"hug buddy\" and hold it tightly to your chest or belly when you cough. DIRECTIONS: 
1. Take 10 to 15 slow deep breaths every hour while awake. 2. Breathe in deeply, and hold it for 2 seconds. 3. Exhale slowly through puckered lips, like blowing up a balloon. 4. After every 4th or 5th deep breath, hug your pillow to your chest or belly and give a hard, deep cough. Yes, it will probably hurt. But doing this exercise is very important part of healing after surgery. Take your pain medicine to help you do this exercise without too much pain. ANKLE PUMPS 
 
 Ankle pumps increase the circulation of oxygenated blood to your lower extremities and decrease your risk for circulation problems such as blood clots. They also stretch the muscles, tendons and ligaments in your foot and ankle, and prevent joint contracture in the ankle and foot, especially after surgeries on the legs. It is important to do ankle pump exercises regularly after surgery because immobility increases your risk for developing a blood clot. Your doctor may also have you take an Aspirin for the next few days as well. If your doctor did not ask you to take an Aspirin, consult with him before starting Aspirin therapy on your own. The exercise is quite simple. · Slowly point your foot forward, feeling the muscles on the top of your lower leg stretch, and hold this position for 5 seconds. · Next, pull your foot back toward you as far as possible, stretching the calf muscles, and hold that position for 5 seconds. · Repeat with the other foot. · Perform 10 repetitions every hour while awake for both ankles if possible (down and then up with the foot once is one repetition). You should feel gentle stretching of the muscles in your lower leg when doing this exercise. If you feel pain, or your range of motion is limited, don't push too hard. Only go the limit your joint and muscles will let you go. If you have increasing pain, progressively worsening leg warmth or swelling, STOP the exercise and call your doctor. Other Wound Care information:  [x] No additional recommendations. Below is information on the medication(s) your doctor is prescribing for you: The maximum daily dose of acetaminophen was discussed with the patient.  She was encouraged not to exceed 3,000 mg of acetaminophen during a 24 hour period and was asked to keep in mind that acetaminophen can also be found in many over-the-counter cold medications as well as narcotics that may be given for pain. The patient expresses understanding of these issues and questions were answered. 4 THINGS ABOUT PAIN MEDICINE I ALWAYS TALK ABOUT: 
There are 4 side effects I always talk about for pain medications. 1. They make you sleepy and drowsy. Do not drive a car or operate machines while taking pain medication. Do not make any major decisions. Take a nap. Relax. Let your body recover from the affects of anesthesia and surgery. 2. Some people have quite a problem with itching and. .. 3. Nausea or vomiting. I mention these together because research tends to suggest there is a gene-related issue. While some have a hard time with these problems, others do not. These are expected and know side effects. Over-the-counter Benadryl® (the drug name is diphenhydramine) can help with the itching. Your doctor may also have given you some medicine for nausea. IF HE DID NOT, CALL HIM/HER. 4. Last but not least is the problem of constipation (not lauri able to have a bowel movement - poop.)  All pain medicine can slow down the movement of food through the gut. The slower it goes, the worse it can be. Frankly, this means hard poop adding insult to the injury of surgery. And if you had tummy surgery, like having your gall bladder removed or a hernia repair, YOU DO NOT WANT THIS PROBLEM. There are 4 things I recommend. · Drink lots of fluids. For healthy people with no heart problems, this means at least 64 ounces of liquids or more per day. For example, a Big Gulp® from [employer] is 32 ounces. So you need to drink at least 2  Big Gulp®'s of fluids every day. If you have heart problems you may not be able to do this. Talk to your doctor about what you should do to prevent constipation.  
 
· Drinking fruit juice like apple, pear, or prune juice gives you extra \"BANG\" for your beverage. These drinks are high in natural fiber. If you are a diabetic, drink sugar-free fluids with fiber additives (see next 2 points.)  Avoid drinking extra fruit juice unless this is a regular part of your diet plan. · Eat extra fresh fruits and vegetables. · Add extra fiber-products. Fiber products like Metamucil®, Citrucel®, Miralax® or Benefiber® can help. These products are over-the-counter and you do not need a prescription from your doctor. If you have followed these recommendations and still have some difficulty having a good poop, take and over-the-counter stimulant like Dulcolax® (biscodyl)  or Senokot® (senna concentrate). These may help get things moving. R[address] EFFECT GUIDE The [address] EFFECT GUIDE was provided to the PATIENT AND CARE PROVIDER. Information provided includes instruction about drug purpose and common side effects for the following medications: · Hydrocodone/Acetaminophen 5/325mg - for Pain Medication information added to discharge record on September 26, 2018 at 1:03 PM. 
 
 
Some information we wish all of our patients to be familiar with and General Information for Healthy Lifestyle choices: · Make a list of your current medications with your Primary Care Provider. · Update this list whenever your medications are discontinued, doses are changed, or new medications (including over-the-counter products like ibuprofen, vitamins, or herbal remedies) are added. · Carry medication information at all times in case of emergency situations No smoking / No tobacco products / Avoid exposure to second hand smoke Surgeon General's Warning:  Quitting smoking now greatly reduces serious risk to your health. Obesity, smoking, and sedentary lifestyle greatly increases your risk for illness.  
A healthy diet, regular physical exercise & weight monitoring are important for maintaining a healthy lifestyle. A Note About Congestive Heart Failure: You may be retaining fluid if you have a history of heart failure or if you experience any of the following symptoms:   
 
· Weight gain of 3 pounds or more overnight or 5 pounds in a week · Increased swelling in our hands or feet · Shortness of breath while lying flat in bed Please call your doctor as soon as you notice any of these symptoms; do not wait until your next office visit. A Note About Strokes: 
Recognize signs and symptoms of STROKE. The simple mnemonic, F.A.S.T., can help you remember signs of a stroke and what to do if you suspect a stroke is occuring to you or someone you are with: 
 
F - Face looks uneven A - Arms unable to move, or move evenly S - Speech is slurred or non-existent T - Time - CALL 911 as soon as signs and symptoms begin - DO NOT go to bed or wait to see if you get better - TIME IS BRAIN. Warning Signs of HEART ATTACK Call 911 if you have these symptoms: 
 
? Chest discomfort. Most heart attacks involve discomfort in the center of the chest that lasts more than a few minutes, or that goes away and comes back. It can feel like uncomfortable pressure, squeezing, fullness, or pain. ? Discomfort in other areas of the upper body. Symptoms can include pain or discomfort in one or both arms, the back, neck, jaw, or stomach. ? Shortness of breath with or without chest discomfort. ? Other signs may include breaking out in a cold sweat, nausea, or lightheadedness. Don't wait more than five minutes to call [address]! Fast action can save your life. Calling 911 is almost always the fastest way to get lifesaving treatment. Emergency Medical Services staff can begin treatment when they arrive  up to an hour sooner than if someone gets to the hospital by car.   
 
 
AT THE COMPLETION OF DISCHARGE INSTRUCTION REVIEW, WE VERIFY: 
 The discharge information has been reviewed with the patient and guardian. Questions have been asked and answered meeting caregiver and guardian expectations. The caregiver verbalized understanding. Your discharge nurse was Jannette CARTER, RN-BC Board Certified - Pain Management Other information found in your discharge envelope: 
[x]     PRESCRIPTIONS     [] Sent electronically to your pharmacy []     PHYSICAL THERAPY PRESCRIPTION 
[]     APPOINTMENT CARDS []     Regional Anesthesia Pamphlet for block or block with On-Q Catheter from Anesthesia  Service []     Medical device information sheets/pamphlets from their   
[]     School/work excuse note. []     /parent work excuse note. The following personal items collected during your admission for safe keeping are returned to you:  
 
Dental Appliance: Dental Appliances: Lowers, Uppers Vi kasey: Visual Aid: Glasses Hearing Aid:   
Jewelry: Jewelry: None Clothing: Clothing: Footwear, Pants, Shirt, Undergarments Other Valuables: Other Valuables: Toya Rosas Valuables sent to safe: Personal Items Sent to Safe: N/A Introducing [facility] & HEALTH SERVICES! [facility] introduces [employer] patient portal. Now you can access parts of your medical record, email your doctor's office, and request medication refills online. 1. In your internet browser, go to [email or web address] 2. Click on the First Time User? Click Here link in the Sign In box. You will see the New Member Sign Up page. 3. Enter your [employer] Access Code exactly as it appears below. You will not need to use this code after youve completed the sign-up process. If you do not sign up before the expiration date, you must request a new code. · [employer] Access Code: [email or web address] Expires: 10/22/2018  2:23 PM 
 
4.  Enter the last four digits of your Social Security Number (xxxx) and Date of Birth (mm/dd/yyyy) as indicated and click Submit. You will be taken to the next sign-up page. 5. Create a Zaubert ID. This will be your [email or web address] login ID and cannot be changed, so think of one that is secure and easy to remember. 6. Create a Zaubert password. You can change your password at any time. 7. Enter your Password Reset Question and Answer. This can be used at a later time if you forget your password. 8. Enter your e-mail address. You will receive e-mail notification when new information is available in [address] 9. Click Sign Up. You can now view and download portions of your medical record. 10. Click the Download Summary menu link to download a portable copy of your medical information. If you have questions, please visit the Frequently Asked Questions section of the [email or web address] website. Remember, [email or web address] is NOT to be used for urgent needs. For medical emergencies, dial 911. Now available from your iPhone and Android! Introducing Wilfredo Marrero As a [employer] patient, I wanted to make you aware of our electronic visit tool called Wilfredo Marrero. [employer] 24/7 allows you to connect within minutes with a medical provider 24 hours a day, seven days a week via a mobile device or tablet or logging into a secure website from your computer. You can access Wilfredo Marrero from anywhere in the [place]. A virtual visit might be right for you when you have a simple condition and feel like you just dont want to get out of bed, or cant get away from work for an appointment, when your regular [employer] provider is not available (evenings, weekends or holidays), or when youre out of town and need minor care. Electronic visits cost only $49 and if the [employer] 24/7 provider determines a prescription is needed to treat your condition, one can be electronically transmitted to a nearby pharmacy*. Please take a moment to enroll today if you have not already done so. The enrollment process is free and takes just a few minutes. To enroll, please download the [employer] 24/7 erin to your tablet or phone, or visit [email or web address] to enroll on your computer. And, as an [address] [place] [address] patient with a Daily[employer] account, the results of your visits will be scanned into your electronic medical record and your primary care provider will be able to view the scanned results. We urge you to continue to see your regular [employer] provider for your ongoing medical care. And while your primary care provider may not be the one available when you seek a [employer] virtual visit, the peace of mind you get from getting a real diagnosis real time can be priceless. For more information on ezeep, view our Frequently Asked Questions (FAQs) at [email or web address]. Sincerely, 
 
Zëo Biggs MD 
Chief Medical Officer Franchesca Rima Andres *:  certain medications cannot be prescribed via ezeep Providers Seen During Your Hospitalization Provider Specialty Primary office phone [place] , [address] [facility] Orthopedic Surgery [phone] Your Primary Care Physician (PCP) Primary Care Physician Office Phone Office Fax Asia VELASCO [phone] You are allergic to the following Allergen Reactions Morphine Itching Recent Documentation Height Weight BMI OB Status Smoking Status 1.549 m 89.9 kg 37.45 kg/m2 Hysterectomy Never Smoker Emergency Contacts Name Discharge Info Relation Home Work Mobile [facility] DISCHARGE CAREGIVER [3] Daughter [21] [phone] Patient Belongings  The following personal items are in your possession at time of discharge: 
  Dental Appliances: Lowers, Uppers  Visual Aid: Glasses   Hearing Aids/Status: Right, Left  Home Medications: None   Jewelry: None  Clothing: Footwear, Pants, Shirt, Undergarments    Other Valuables: Eyeglasses  Personal Items Sent to Safe: N/A Please provide this summary of care documentation to your next provider. Signatures-by signing, you are acknowledging that this After Visit Summary has been reviewed with you and you have received a copy. Patient Signature:  ____________________________________________________________ Date:  ____________________________________________________________  
  
Robe Sport Provider Signature:  ____________________________________________________________ Date:  ____________________________________________________________

## 2018-09-26 NOTE — PROGRESS NOTES
POST ANESTHESIA CARE DISCHARGE / TRANSFER NOTE Adiel Rangel was   discharged     via wheel chair     to     private vehicle    . Patient was escorted by    nurse     . Patient verbalized   appreciation and was very pleased with care received    throughout their stay. Patient was discharged in     pleasant mood    . Pain at discharge/transfer was     0 /10. Discharge, medication and follow-up instructions were verbalized as understood prior to discharge  (if applicable for same-day procedures being discharged.) All personal belongings have been returned to patient, and patient/family verbally confirm receiving belongings as all present.  
 
Signed: Brock CARTER RN-BC

## 2018-09-26 NOTE — PERIOP NOTES
PACU IN REPORT FROM ANESTHESIA Verbal report received from   [address]  following Other for Procedure(s) (LRB): LEFT ENDOSCOPIC CARPAL TUNNEL RELEASE (AXILLARY BLOCK)  (Left) by  Marcy Rodriguez MD. 
 
Note the anesthesia record for medications given intraoperatively. Brief Initial Visual Assessment: 
 
Patient Age: [] Infant(1-12mo)      []Pediatric(1-13yrs)    [] Adolescent(13-18yrs) [] Adult(18-65yrs)      [x]Geriatric Adult(>65yrs). Patient    [x] Alert           []Calm & Cooperative      [] Anxious Appearance: [x] Drowsy      [] Sedated                          [] Unresponsive Oriented x 3 Airway:     [x] Patent   
                      [] Near-obstructed   [] Obstructed 
                      []Improved with head/airway repositioning Airway Adjuncts Present: [] Oral Airway    [] Nasal Trumpet      
  [] ETT Respiratory  [x] Even              [] Labored      [] Shallow Pattern:    [x] Non-Labored  [] VENT and/or respiratory assistance 
   being provided. Skin:     [x] Pink [] Pale [x] Warm [] Cool [] Cold [x]Dry [] Moist [] Diaphoretic Membranes:  [x] Pink [] Pale    
  [x] Moist [] Dry [] Crusty Pain:   [x] No Acute Discomfort. 0/10 Scale [x] Verbal Numeric 
 [] Moderate Discomfort.      [] V.A.S. [] Acute Discomfort.                [] A.N.V. 
  [] Chronic-Issue Related Discomfort.   [] F.L.A.C.C. Note E-MAR for medications administered. []Faces, Roa/Branden Note assessments documented in flowsheets; any assessment variants to be found in comments or narrative perioperative nurse notes.     
 
Post-anesthesia care now assumed by [facility] BSN, RN-BC

## 2018-09-27 ENCOUNTER — PATIENT OUTREACH (OUTPATIENT)
Dept: FAMILY MEDICINE CLINIC | Age: 83
End: 2018-09-27

## 2018-09-27 NOTE — OP NOTES
[address] REPORT    Fatoumata Stone  MR#: [id number]  : 1934  ACCOUNT #: [de-identified]   DATE OF SERVICE: 2018    PREOPERATIVE DIAGNOSIS:  Left carpal tunnel syndrome. POSTOPERATIVE DIAGNOSIS:  Left carpal tunnel syndrome. PROCEDURE PERFORMED:  Left endoscopic carpal tunnel release. SURGEON:  BERTRAND Locke MD    ASSISTANT:  Alistair Saleh PA-C    ANESTHESIA:  Axillary block. COMPLICATIONS:  None. ESTIMATED BLOOD LOSS:  Zero. SPECIMENS REMOVED:  none. IMPLANTS:  none    TOURNIQUET TIME:  10 minutes. INDICATIONS:  The patient is an 80-year-old female with a history of numbness, tingling and pain in her left hand. Nerve testing has revealed severe carpal tunnel syndrome. She has been counseled regarding surgical and nonsurgical treatment options as well as the likelihood of incomplete recovery. She elected to proceed with surgical release. The risks and benefits of the surgery were discussed in detail. DESCRIPTION OF PROCEDURE:  The patient was taken to the operating room and placed supine on the operating table. Following administration of axillary block anesthetic, the left arm prepped and draped in sterile fashion with Hibiclens and alcohol. Tourniquet was applied to the left proximal arm. The arm was exsanguinated with an Esmarch bandage and tourniquet inflated to 250 mmHg. An incision was made over the volar distal forearm parallel in the volar wrist crease. Subcutaneous dissection was carried out and the palmaris longus tendon identified and protected. The distal forearm fascia was released exposing the median nerve. Sequential dilators placed beneath the fascia on top of the median nerve and through the carpal canal beneath the transverse carpal ligament. The endoscope was placed into the space created. The median nerve, flexor tendons and transverse carpal ligament were identified.   The endoscopic blade was then deployed and the transverse carpal ligament divided from distal to proximal under direct vision. The wound was copiously irrigated with sterile saline. Hemostasis achieved with bipolar cautery. The nerve was noted to be flattened and hyperemic at the level of the canal.  The distal forearm fascia proximally was released. The wound was repaired using 4-0 Vicryl interrupted inverted subcutaneous and a running 5-0 Prolene subcuticular with Benzoin and Steri-Strips. A sterile bulky soft wrist dressing was applied and the tourniquet let down. The patient awakened from anesthesia and discharged to the recovery room in stable condition. During the procedure, a physician assistant was vital to the outcome of the case providing stability to the arm, retracted crucial structures and assistance with wound closure. DISCHARGE PLAN:  The patient instructed to keep arm elevated, clean and dry at all times. To call with any questions or concerns. Follow up in 10 days for suture removal, wound check and hand therapy referral if indicated. She was given a prescription for hydrocodone for pain control.       MD NORMA Sanchez / MARCELA  D: 09/27/2018 08:11     T: 09/27/2018 08:44  JOB #: [id number]

## 2018-10-10 ENCOUNTER — OFFICE VISIT (OUTPATIENT)
Dept: FAMILY MEDICINE CLINIC | Age: 83
End: 2018-10-10

## 2018-10-10 VITALS
DIASTOLIC BLOOD PRESSURE: 74 MMHG | HEART RATE: 77 BPM | BODY MASS INDEX: 36.82 KG/M2 | WEIGHT: 195 LBS | SYSTOLIC BLOOD PRESSURE: 113 MMHG | OXYGEN SATURATION: 96 % | TEMPERATURE: 97.5 F | HEIGHT: 61 IN

## 2018-10-10 DIAGNOSIS — J02.9 SORE THROAT: ICD-10-CM

## 2018-10-10 DIAGNOSIS — J06.9 VIRAL URI: Primary | ICD-10-CM

## 2018-10-10 LAB
S PYO AG THROAT QL: NEGATIVE
VALID INTERNAL CONTROL?: NO

## 2018-10-10 RX ORDER — GUAIFENESIN 600 MG/1
600 TABLET, EXTENDED RELEASE ORAL 2 TIMES DAILY
Qty: 14 TAB | Refills: 0 | Status: SHIPPED | OUTPATIENT
Start: 2018-10-10 | End: 2018-10-17

## 2018-10-10 RX ORDER — CETIRIZINE HCL 10 MG
10 TABLET ORAL DAILY
Qty: 30 TAB | Refills: 2 | Status: SHIPPED | OUTPATIENT
Start: 2018-10-10 | End: 2018-11-16

## 2018-10-10 NOTE — PROGRESS NOTES
[address] [facility]    Subjective:   Lesli Boateng is a 80 y.o. female   CC: sore throat   History provided by patient    HPI:  Symptoms: Sore throat that started last night. Pt endorses some congestion and post nasal drip. Pt denies fever, HA, cough, dizziness/lightheadedness, SOB, CP, N/V/D. No sick contacts or recent travel. Current Outpatient Prescriptions on File Prior to Visit   Medication Sig Dispense Refill    mirtazapine (REMERON) 15 mg tablet Take 2 Tabs by mouth nightly. 180 Tab 1    apixaban (ELIQUIS) 5 mg tablet Take 1 Tab by mouth two (2) times a day. 180 Tab 2    furosemide (LASIX) 80 mg tablet Take 1 Tab by mouth daily. 90 Tab 1    spironolactone (ALDACTONE) 25 mg tablet Take 1 Tab by mouth daily. 90 Tab 1    montelukast (SINGULAIR) 10 mg tablet Take 1 Tab by mouth daily. 90 Tab 1    simvastatin (ZOCOR) 20 mg tablet Take 1 Tab by mouth nightly. 90 Tab 1    cholecalciferol (VITAMIN D3) 1,000 unit tablet Take  by mouth daily.  melatonin tab tablet Take 5 mg by mouth nightly.  docusate sodium (STOOL SOFTENER PO) Take  by mouth as needed.  ferrous sulfate 325 mg (65 mg iron) tablet Take  by mouth Daily (before breakfast).  albuterol (PROVENTIL HFA, VENTOLIN HFA, PROAIR HFA) 90 mcg/actuation inhaler Take 2 Puffs by inhalation.  fluticasone (FLOVENT HFA) 220 mcg/actuation inhaler Take 2 Puffs by inhalation two (2) times a day.  metOLazone (ZAROXOLYN) 2.5 mg tablet Take 2.5 mg by mouth daily. For weight gain 3 lbs      multivitamin (ONE A DAY) tablet Take 1 Tab by mouth daily.  HYDROcodone-acetaminophen (NORCO) 5-325 mg per tablet 1 tab PO Q 4-6 hrs PRN after food 20 Tab 0    calcium carbonate (OS-JUSTIN) 500 mg calcium (1,250 mg) tablet Take 1,200 Tabs by mouth daily. No current facility-administered medications on file prior to visit. Patient Active Problem List   Diagnosis Code    Severe obesity (BMI 35.0-39. 9) E66.01    Chronic congestive heart failure (HCC) I50.9    Persistent atrial fibrillation (HCC) I48.1    Moderate persistent asthma without complication B65.42    Stage 3 chronic kidney disease (HCC) N18.3       Social History     Social History    Marital status: UNKNOWN     Spouse name: N/A    Number of children: N/A    Years of education: N/A     Occupational History    Not on file. Social History Main Topics    Smoking status: Never Smoker    Smokeless tobacco: Never Used    Alcohol use Yes      Comment: occasional glass of wine    Drug use: No    Sexual activity: Not on file     Other Topics Concern    Not on file     Social History Narrative       ROS: See HPI      Objective:     Visit Vitals    /74    Pulse 77    Temp 97.5 °F (36.4 °C) (Oral)    Ht 5' 1\" (1.549 m)    Wt 195 lb (88.5 kg)    SpO2 96%    BMI 36.84 kg/m2        Physical Exam   Constitutional: She appears well-developed and well-nourished. No distress. HENT:   Head: Normocephalic and atraumatic. Right Ear: External ear normal.   Left Ear: External ear normal.   Nose: Rhinorrhea present. Right sinus exhibits no maxillary sinus tenderness and no frontal sinus tenderness. Left sinus exhibits no maxillary sinus tenderness and no frontal sinus tenderness. Mouth/Throat: Oropharynx is clear and moist and mucous membranes are normal. No oropharyngeal exudate, posterior oropharyngeal edema or posterior oropharyngeal erythema. Eyes: Pupils are equal, round, and reactive to light. Pulmonary/Chest: Effort normal and breath sounds normal. No respiratory distress. She has no wheezes. She has no rales. She exhibits no tenderness. Lymphadenopathy:     She has no cervical adenopathy. Skin: She is not diaphoretic. Pertinent Labs/Studies:  Rapid strep negtive    Assessment and orders:       ICD-10-CM ICD-9-CM    1. Viral URI J06.9 465.9 cetirizine (ZYRTEC) 10 mg tablet      guaiFENesin ER (MUCINEX) 600 mg ER tablet   2.  Sore throat J02.9 462 AMB POC RAPID STREP A       Sore Throat/ Viral URI: Likely viral in nature. - Rapid Strep Test was Neg  - Salt water gargle for pain  - Tylenol or Motrin prn for pain  - Zyrtec for post nasal drip  - Throat lozenges, such as Halls, as needed  - Mucinex as needed  - Advised to stay well hydrated, use tylenol/ibuprogen for fever/pain, and use saline washes (nasal spray and gargles)    I have reviewed patient medical and social history and medications. I have reviewed pertinent labs results and other data. I have discussed the diagnosis with the patient and the intended plan as seen in the above orders. The patient has received an after-visit summary and questions were answered concerning future plans. I have discussed medication side effects and warnings with the patient as well.     Ed Andrews MD  [address] Resident   10/10/18    Patient discussed with Dr. Ashlyn Baxter, Attending Physician

## 2018-10-10 NOTE — PROGRESS NOTES
Chief Complaint   Patient presents with    Sore Throat    Wheezing     hx of asthma, states took inhalers this morning     1. Have you been to the ER, urgent care clinic since your last visit? Hospitalized since your last visit? No    2. Have you seen or consulted any other health care providers outside of the [address] [place] [address] since your last visit? Include any pap smears or colon screening.  No

## 2018-10-10 NOTE — PATIENT INSTRUCTIONS
Sore Throat: Care Instructions  Your Care Instructions    Infection by bacteria or a virus causes most sore throats. Cigarette smoke, dry air, air pollution, allergies, and yelling can also cause a sore throat. Sore throats can be painful and annoying. Fortunately, most sore throats go away on their own. If you have a bacterial infection, your doctor may prescribe antibiotics. Follow-up care is a key part of your treatment and safety. Be sure to make and go to all appointments, and call your doctor if you are having problems. It's also a good idea to know your test results and keep a list of the medicines you take. How can you care for yourself at home? · If your doctor prescribed antibiotics, take them as directed. Do not stop taking them just because you feel better. You need to take the full course of antibiotics. · Gargle with warm salt water once an hour to help reduce swelling and relieve discomfort. Use 1 teaspoon of salt mixed in 1 cup of warm water. · Take an over-the-counter pain medicine, such as acetaminophen (Tylenol), ibuprofen (Advil, Motrin), or naproxen (Aleve). Read and follow all instructions on the label. · Be careful when taking over-the-counter cold or flu medicines and Tylenol at the same time. Many of these medicines have acetaminophen, which is Tylenol. Read the labels to make sure that you are not taking more than the recommended dose. Too much acetaminophen (Tylenol) can be harmful. · Drink plenty of fluids. Fluids may help soothe an irritated throat. Hot fluids, such as tea or soup, may help decrease throat pain. · Use over-the-counter throat lozenges to soothe pain. Regular cough drops or hard candy may also help. These should not be given to young children because of the risk of choking. · Do not smoke or allow others to smoke around you. If you need help quitting, talk to your doctor about stop-smoking programs and medicines.  These can increase your chances of quitting for good. · Use a vaporizer or humidifier to add moisture to your bedroom. Follow the directions for cleaning the machine. When should you call for help? Call your doctor now or seek immediate medical care if:    · You have new or worse trouble swallowing.     · Your sore throat gets much worse on one side.    Watch closely for changes in your health, and be sure to contact your doctor if you do not get better as expected. Where can you learn more? Go to http://daany-dylan.info/. Enter 062 [id number] in the search box to learn more about \"Sore Throat: Care Instructions. \"  Current as of: March 28, 2018  Content Version: 11.8  © 0591-8897 Healthwise, Incorporated. Care instructions adapted under license by [employer] (which disclaims liability or warranty for this information). If you have questions about a medical condition or this instruction, always ask your healthcare professional. [address] any warranty or liability for your use of this information.

## 2018-10-10 NOTE — MR AVS SNAPSHOT
[address] [phone] Patient: Huber Coleman MRN: [id number] MYY:[birth year] Visit Information Date & Time Provider Department Dept. Phone Encounter #  
 10/10/2018 10:30 AM Brianne Bronson  [facility] [phone] [id number] Your Appointments 11/16/2018  1:30 PM  
ACUTE CARE with Jordana Chavarria MD  
[address] [place] [address]) Appt Note: 3 month recheck,   get the 2nd shingle shot  
 [address] [facility] [phone]  
  
   
 [address] [place] [address] [address] [address] Upcoming Health Maintenance Date Due  
 GLAUCOMA SCREENING Q2Y 6/17/1999 Bone Densitometry (Dexa) Screening 6/17/1999 MEDICARE YEARLY EXAM 3/14/2018 Influenza Age 5 to Adult 10/16/2018* DTaP/Tdap/Td series (1 - Tdap) 8/16/2019* Shingrix Vaccine Age 50> (2 of 2) 10/11/2018 Pneumococcal 65+ Low/Medium Risk (2 of 2 - PPSV23) 9/6/2019 *Topic was postponed. The date shown is not the original due date. Allergies as of 10/10/2018  Review Complete On: 10/10/2018 By: Stefanie Malin LPN Severity Noted Reaction Type Reactions Morphine  09/29/2017    Itching Current Immunizations  Never Reviewed Name Date Pneumococcal Conjugate (PCV-13) 9/6/2018 Zoster Recombinant 8/16/2018 Not reviewed this visit You Were Diagnosed With   
  
 Codes Comments Viral pharyngitis    -  Primary ICD-10-CM: J02.9 ICD-9-CM: 890 Sore throat     ICD-10-CM: J02.9 ICD-9-CM: 736 Vitals BP Pulse Temp Height(growth percentile) Weight(growth percentile) SpO2  
 113/74 77 97.5 °F (36.4 °C) (Oral) 5' 1\" (1.549 m) 195 lb (88.5 kg) 96% BMI OB Status Smoking Status 36.84 kg/m2 Hysterectomy Never Smoker Vitals History BMI and BSA Data  Body Mass Index Body Surface Area  
 36.84 kg/m 2 1.95 m 2  
  
 Preferred Pharmacy Pharmacy Name Phone [facility], [address] [address] [place] [address] [address] Your Updated Medication List  
  
   
This list is accurate as of 10/10/18 11:33 AM.  Always use your most recent med list.  
  
  
  
  
 albuterol 90 mcg/actuation inhaler Commonly known as:  PROVENTIL HFA, VENTOLIN HFA, PROAIR HFA Take 2 Puffs by inhalation. apixaban 5 mg tablet Commonly known as:  Claudio Sprinkles Take 1 Tab by mouth two (2) times a day. calcium carbonate 500 mg calcium (1,250 mg) tablet Commonly known as:  OS-JUSTIN Take 1,200 Tabs by mouth daily. cetirizine 10 mg tablet Commonly known as:  ZYRTEC Take 1 Tab by mouth daily. ferrous sulfate 325 mg (65 mg iron) tablet Take  by mouth Daily (before breakfast). FLOVENT  mcg/actuation inhaler Generic drug:  fluticasone Take 2 Puffs by inhalation two (2) times a day. furosemide 80 mg tablet Commonly known as:  LASIX Take 1 Tab by mouth daily. HYDROcodone-acetaminophen 5-325 mg per tablet Commonly known as:  NORCO  
1 tab PO Q 4-6 hrs PRN after food  
  
 melatonin Tab tablet Take 5 mg by mouth nightly. mirtazapine 15 mg tablet Commonly known as:  Eliazar Smith Take 2 Tabs by mouth nightly. montelukast 10 mg tablet Commonly known as:  SINGULAIR Take 1 Tab by mouth daily. multivitamin tablet Commonly known as:  ONE A DAY Take 1 Tab by mouth daily. Oxygen 2l via nasal cannula at night  
  
 simvastatin 20 mg tablet Commonly known as:  ZOCOR Take 1 Tab by mouth nightly. spironolactone 25 mg tablet Commonly known as:  ALDACTONE Take 1 Tab by mouth daily. STOOL SOFTENER PO Take  by mouth as needed. TYLENOL EXTRA STRENGTH PO Take  by mouth. Take 1000mg in am & pm , 500mg mid day VITAMIN D3 1,000 unit tablet Generic drug:  cholecalciferol Take  by mouth daily. ZAROXOLYN 2.5 mg tablet Generic drug:  metOLazone Take 2.5 mg by mouth daily. For weight gain 3 lbs Prescriptions Sent to Pharmacy Refills  
 cetirizine (ZYRTEC) 10 mg tablet 2 Sig: Take 1 Tab by mouth daily. Class: Normal  
 [address] Ph #: [phone] Route: Oral  
  
We Performed the Following AMB POC RAPID STREP A [71887 CPT(R)] Patient Instructions Sore Throat: Care Instructions Your Care Instructions Infection by bacteria or a virus causes most sore throats. Cigarette smoke, dry air, air pollution, allergies, and yelling can also cause a sore throat. Sore throats can be painful and annoying. Fortunately, most sore throats go away on their own. If you have a bacterial infection, your doctor may prescribe antibiotics. Follow-up care is a key part of your treatment and safety. Be sure to make and go to all appointments, and call your doctor if you are having problems. It's also a good idea to know your test results and keep a list of the medicines you take. How can you care for yourself at home? · If your doctor prescribed antibiotics, take them as directed. Do not stop taking them just because you feel better. You need to take the full course of antibiotics. · Gargle with warm salt water once an hour to help reduce swelling and relieve discomfort. Use 1 teaspoon of salt mixed in 1 cup of warm water. · Take an over-the-counter pain medicine, such as acetaminophen (Tylenol), ibuprofen (Advil, Motrin), or naproxen (Aleve). Read and follow all instructions on the label. · Be careful when taking over-the-counter cold or flu medicines and Tylenol at the same time. Many of these medicines have acetaminophen, which is Tylenol. Read the labels to make sure that you are not taking more than the recommended dose. Too much acetaminophen (Tylenol) can be harmful. · Drink plenty of fluids. Fluids may help soothe an irritated throat. Hot fluids, such as tea or soup, may help decrease throat pain. · Use over-the-counter throat lozenges to soothe pain. Regular cough drops or hard candy may also help. These should not be given to young children because of the risk of choking. · Do not smoke or allow others to smoke around you. If you need help quitting, talk to your doctor about stop-smoking programs and medicines. These can increase your chances of quitting for good. · Use a vaporizer or humidifier to add moisture to your bedroom. Follow the directions for cleaning the machine. When should you call for help? Call your doctor now or seek immediate medical care if: 
  · You have new or worse trouble swallowing.  
  · Your sore throat gets much worse on one side.  
 Watch closely for changes in your health, and be sure to contact your doctor if you do not get better as expected. Where can you learn more? Go to http://danay-dylan.info/. Enter [phone] [id number] [phone] in the search box to learn more about \"Sore Throat: Care Instructions. \" Current as of: March 28, 2018 Content Version: 11.8 © 6198-8261 [email or web address]. Care instructions adapted under license by [employer] (which disclaims liability or warranty for this information). If you have questions about a medical condition or this instruction, always ask your healthcare professional. [place] [address] any warranty or liability for your use of this information. Introducing [facility]! [address] introduces [employer] patient portal. Now you can access parts of your medical record, email your doctor's office, and request medication refills online. 1. In your internet browser, go to [email or web address] 2. Click on the First Time User? Click Here link in the Sign In box. You will see the New Member Sign Up page. 3. Enter your [employer] Access Code exactly as it appears below. You will not need to use this code after youve completed the sign-up process. If you do not sign up before the expiration date, you must request a new code. · [employer] Access Code: [email or web address] Expires: 10/22/2018  2:23 PM 
 
4. Enter the last four digits of your Social Security Number (xxxx) and Date of Birth (mm/dd/yyyy) as indicated and click Submit. You will be taken to the next sign-up page. 5. Create a [employer] ID. This will be your [employer] login ID and cannot be changed, so think of one that is secure and easy to remember. 6. Create a [employer] password. You can change your password at any time. 7. Enter your Password Reset Question and Answer. This can be used at a later time if you forget your password. 8. Enter your e-mail address. You will receive e-mail notification when new information is available in [address] 9. Click Sign Up. You can now view and download portions of your medical record. 10. Click the Download Summary menu link to download a portable copy of your medical information. If you have questions, please visit the Frequently Asked Questions section of the [employer] website. Remember, [employer] is NOT to be used for urgent needs. For medical emergencies, dial 911. Now available from your iPhone and Android! Please provide this summary of care documentation to your next provider. Your primary care clinician is listed as Brooklyn Barroso. If you have any questions after today's visit, please call [phone].

## 2018-10-16 RX ORDER — SPIRONOLACTONE 25 MG/1
25 TABLET ORAL DAILY
Qty: 90 TAB | Refills: 1 | Status: SHIPPED | OUTPATIENT
Start: 2018-10-16 | End: 2018-10-25

## 2018-10-25 ENCOUNTER — OFFICE VISIT (OUTPATIENT)
Dept: FAMILY MEDICINE CLINIC | Age: 83
End: 2018-10-25

## 2018-10-25 VITALS
HEART RATE: 100 BPM | HEIGHT: 61 IN | OXYGEN SATURATION: 94 % | TEMPERATURE: 95.7 F | DIASTOLIC BLOOD PRESSURE: 71 MMHG | WEIGHT: 195 LBS | SYSTOLIC BLOOD PRESSURE: 103 MMHG | RESPIRATION RATE: 20 BRPM | BODY MASS INDEX: 36.82 KG/M2

## 2018-10-25 RX ORDER — MAGNESIUM 200 MG
1000 TABLET ORAL DAILY
COMMUNITY

## 2018-10-25 RX ORDER — SPIRONOLACTONE 25 MG/1
25 TABLET ORAL 2 TIMES DAILY
Qty: 60 TAB | Refills: 6 | Status: SHIPPED | OUTPATIENT
Start: 2018-10-25 | End: 2019-08-09 | Stop reason: SDUPTHER

## 2018-10-25 RX ORDER — PREDNISONE 10 MG/1
TABLET ORAL SEE ADMIN INSTRUCTIONS
COMMUNITY
End: 2018-10-25 | Stop reason: ALTCHOICE

## 2018-10-25 NOTE — PROGRESS NOTES
[address]  [address] [facility] [address]  [phone]             Date of visit: 10/25/2018   Subjective:      History obtained from:  child and the patient. Nia Solorio is a 80 y.o. female who presents today for transitional care. she was discharged from [address] [place] [address] on 10/18/18. Post-discharge telephone contact was made within 2 business days by our nurse navigator. I have done her medication reconciliation. Pt presented to hospital with dyspnea, hypoxia, and congestion and thought to be multifactorial from acute on chronic CHF and URI (+ rhinovirus during admission). Pt also with hx of A-Fib and is on Eliquis. Pt was diuresed and started on Prednisone taper. Pt spent 2 nights in the hospital and discharged on 12 day prednisone taper( 50,40,30,20,10,5 x2 days each) and 3 days of Levaquin. Pt has 5 more days of prednisone taper left as of today. Pt reports that she has had improvement in her sxs and only has occasional cough. Pt has F/U with her Cardiologist (Dr. Dejuan Hernandez) next month. Pt uses 2L of O2 at night and has not had increase oxygen demand since discharge. Pt has not had Flu shot and would like to get it at [facility] this week. Patient Active Problem List    Diagnosis Date Noted    Severe obesity (BMI 35.0-39.9) 08/16/2018    Chronic congestive heart failure ([address] 08/16/2018    Persistent atrial fibrillation ([address] 08/16/2018    Moderate persistent asthma without complication 23/19/3587    Stage 3 chronic kidney disease ([address] 08/16/2018     Current Outpatient Medications   Medication Sig Dispense Refill    cyanocobalamin (VITAMIN B-12) 1,000 mcg sublingual tablet Take 1,000 mcg by mouth daily.  spironolactone (ALDACTONE) 25 mg tablet Take 1 Tab by mouth two (2) times a day. 60 Tab 6    acetaminophen (TYLENOL EXTRA STRENGTH PO) Take  by mouth.  Take 1000mg in am & pm , 500mg mid day      Oxygen 2l via nasal cannula at night      mirtazapine (REMERON) 15 mg tablet Take 2 Tabs by mouth nightly. 180 Tab 1    apixaban (ELIQUIS) 5 mg tablet Take 1 Tab by mouth two (2) times a day. 180 Tab 2    furosemide (LASIX) 80 mg tablet Take 1 Tab by mouth daily. 90 Tab 1    montelukast (SINGULAIR) 10 mg tablet Take 1 Tab by mouth daily. 90 Tab 1    simvastatin (ZOCOR) 20 mg tablet Take 1 Tab by mouth nightly. 90 Tab 1    cholecalciferol (VITAMIN D3) 1,000 unit tablet Take  by mouth daily.  melatonin tab tablet Take 5 mg by mouth nightly.  docusate sodium (STOOL SOFTENER PO) Take  by mouth as needed.  ferrous sulfate 325 mg (65 mg iron) tablet Take  by mouth Daily (before breakfast).  albuterol (PROVENTIL HFA, VENTOLIN HFA, PROAIR HFA) 90 mcg/actuation inhaler Take 2 Puffs by inhalation.  fluticasone (FLOVENT HFA) 220 mcg/actuation inhaler Take 2 Puffs by inhalation two (2) times a day.  metOLazone (ZAROXOLYN) 2.5 mg tablet Take 2.5 mg by mouth daily. For weight gain 3 lbs      multivitamin (ONE A DAY) tablet Take 1 Tab by mouth daily.  calcium carbonate (OS-JUSTIN) 500 mg calcium (1,250 mg) tablet Take 1,200 Tabs by mouth daily.  cetirizine (ZYRTEC) 10 mg tablet Take 1 Tab by mouth daily. 30 Tab 2    HYDROcodone-acetaminophen (NORCO) 5-325 mg per tablet 1 tab PO Q 4-6 hrs PRN after food 20 Tab 0     Allergies   Allergen Reactions    Morphine Itching     Past Medical History:   Diagnosis Date    Asthma     Atrial fibrillation (HCC)     Congestive heart failure (HCC)      Past Surgical History:   Procedure Laterality Date    HX CHOLECYSTECTOMY      HX GASTRIC BYPASS  1979    HX HERNIA REPAIR      HX KNEE REPLACEMENT Bilateral      No family history on file.   Social History     Tobacco Use    Smoking status: Never Smoker    Smokeless tobacco: Never Used   Substance Use Topics    Alcohol use: Yes     Comment: occasional glass of wine      Social History     Social History Narrative    Not on file Review of Systems  General/Constitutional:   No headache, fever, fatigue, weight loss or weight gain       Eyes:   No redness, pruritis, pain, visual changes, swelling, or discharge      Ears:    No pain, loss or changes in hearing     Neck:   No swelling, masses, stiffness, pain, or limited movement     Cardiac:    No chest pain      Respiratory:   No cough or shortness of breath     GI:   No nausea/vomiting, diarrhea, abdominal pain, bloody or dark stools       :   No dysuria or  hematuria    Neurological:   No loss of consciousness, dizziness, seizures, dysarthria, cognitive changes, memory changes,  problems with balance, or unilateral weakness     Skin: No rash      Objective:     Vitals:    10/25/18 1107   BP: 103/71   Pulse: 100   Resp: 20   Temp: 95.7 °F (35.4 °C)   TempSrc: Oral   SpO2: 94%   Weight: 195 lb (88.5 kg)   Height: 5' 1\" (1.549 m)     Body mass index is 36.84 kg/m². Physical Examination:   GEN: No apparent distress. Alert and oriented and responds to all questions appropriately. EYES:  Conjunctiva clear; pupils round and reactive to light; extraocular movements are intact. NOSE: Turbinates are within normal limits. No drainage  OROPHYARYNX: No oral lesions or exudates. NECK:  Supple; no masses; thyroid normal           LUNGS: Respirations unlabored; clear to auscultation bilaterally  CARDIOVASCULAR: Regular, rate, and rhythm without murmurs, gallops or rubs   ABDOMEN: Soft; nontender; nondistended; normoactive bowel sounds; no masses or organomegaly  NEUROLOGIC:  No focal neurologic deficits. Strength and sensation grossly intact. Coordination and gait grossly intact. EXT: Well perfused. No edema. SKIN: No obvious rashes.     Lab Results   Component Value Date/Time    Hemoglobin A1c 5.3 07/24/2018 02:58 PM     No results found for: CHOL, CHOLPOCT, CHOLX, CHLST, CHOLV, HDL, HDLPOC, LDL, LDLCPOC, LDLC, DLDLP, VLDLC, VLDL, TGLX, TRIGL, TRIGP, TGLPOCT, CHHD, CHHDX  Lab Results Component Value Date/Time    Sodium 142 09/06/2018 09:52 AM    Potassium 3.9 09/06/2018 09:52 AM    Chloride 93 (L) 09/06/2018 09:52 AM    CO2 27 09/06/2018 09:52 AM    Glucose 104 (H) 09/06/2018 09:52 AM    BUN 49 (H) 09/06/2018 09:52 AM    Creatinine 1.27 (H) 09/06/2018 09:52 AM    BUN/Creatinine ratio 39 (H) 09/06/2018 09:52 AM    GFR est AA 45 (L) 09/06/2018 09:52 AM    GFR est non-AA 39 (L) 09/06/2018 09:52 AM    Calcium 9.8 09/06/2018 09:52 AM    Bilirubin, total 0.5 07/24/2018 02:58 PM    AST (SGOT) 20 07/24/2018 02:58 PM    Alk. phosphatase 100 07/24/2018 02:58 PM    Protein, total 7.1 07/24/2018 02:58 PM    Albumin 4.5 07/24/2018 02:58 PM    A-G Ratio 1.7 07/24/2018 02:58 PM    ALT (SGPT) 10 07/24/2018 02:58 PM     Lab Results   Component Value Date/Time    WBC 6.2 09/06/2018 09:52 AM    HGB 13.6 09/06/2018 09:52 AM    HCT 42.2 09/06/2018 09:52 AM    PLATELET 887 74/25/4570 09:52 AM    MCV 94 09/06/2018 09:52 AM     No results found for: TSH, TSH2, TSH3, TSHP, TSHELE, TT3, T3U, T3UP, FRT3, FT3, FT4, FT4P, T4, T4P, FT4T, TT7, TSHEXT, TSHEXT  No results found for: VITD3, MALLORY GauthierRIA      Assessment/Plan:       ICD-10-CM ICD-9-CM    1. Transition of care performed with sharing of clinical summary Z91.89 V15.89        Orders Placed This Encounter        spironolactone (ALDACTONE) 25 mg tablet     Sig: Take 1 Tab by mouth two (2) times a day. Dispense:  60 Tab     Refill:  6     1. Transition of care following hospitalization for CHF exacerbation vs URI (+ Rhinovirus)   - Continue Prednisone taper as prescribed  - F/U with Cardiology for continued CHF/AFib management  - Pt to receive flu shot this week at [facility]   - Pt reports error in Aldactone Rx and corrected to BID dosing today   - continue O2 therapy at night   - F/U with Cardiologist      Discussed the diagnosis and plan and she expressed understanding.   AVS was printed, given to patient and briefly discussed prior to patient's departure from the office today.     Follow-up Disposition: Not on File    Ahsan Mcbride MD

## 2018-10-25 NOTE — PROGRESS NOTES
Chief Complaint   Patient presents with   [facility] follow up [place] , COPD exacerbation     1. Have you been to the ER, urgent care clinic since your last visit? Hospitalized since your last visit? Yes [facility] 10/16/208 COPD exacerbation    2. Have you seen or consulted any other health care providers outside of the [address] [place] [address] since your last visit? Include any pap smears or colon screening.  pulmonology

## 2018-10-25 NOTE — PROGRESS NOTES
Admitted on 10/16  Discharged on 10/18  Seen at [facility]    Admitted for RICH, hypoxia, and congestion    + rhinovirus    Diuresed while hospitalized    Put on steroid taper and levaquin for 3 days    Pt is on 2 liters of oxygen at night    Has hx afib and CHF -- followed by Dr. Trey Kc    I reviewed with the resident the medical history and the resident's findings on the physical examination. I discussed with the resident the patient's diagnosis and concur with the plan.

## 2018-10-31 NOTE — TELEPHONE ENCOUNTER
[facility] refill request for:    FLOVENT HFA AER  PROAIR HFA AER    No dosages given, could not find recent medications on her listing.

## 2018-11-02 RX ORDER — ALBUTEROL SULFATE 90 UG/1
2 AEROSOL, METERED RESPIRATORY (INHALATION)
Qty: 1 INHALER | Refills: 3 | Status: SHIPPED | OUTPATIENT
Start: 2018-11-02 | End: 2018-11-29 | Stop reason: SDUPTHER

## 2018-11-05 RX ORDER — ALBUTEROL SULFATE 90 UG/1
2 AEROSOL, METERED RESPIRATORY (INHALATION)
Qty: 1 INHALER | Refills: 3 | Status: CANCELLED | OUTPATIENT
Start: 2018-11-05

## 2018-11-05 NOTE — TELEPHONE ENCOUNTER
----- Message from Marianne Andrew sent at 11/5/2018  1:32 PM EST -----  Regarding: Dr. Frederick Sellers refill  Pt. Is requesting a refill on Flovent 220 mcg and Proair Hsa.  [facility] [phone]

## 2018-11-06 RX ORDER — FLUTICASONE PROPIONATE 220 UG/1
2 AEROSOL, METERED RESPIRATORY (INHALATION) 2 TIMES DAILY
Qty: 1 INHALER | Refills: 3 | Status: SHIPPED | OUTPATIENT
Start: 2018-11-06 | End: 2018-11-29 | Stop reason: SDUPTHER

## 2018-11-16 ENCOUNTER — OFFICE VISIT (OUTPATIENT)
Dept: FAMILY MEDICINE CLINIC | Age: 83
End: 2018-11-16

## 2018-11-16 VITALS
HEART RATE: 88 BPM | RESPIRATION RATE: 18 BRPM | TEMPERATURE: 97.5 F | SYSTOLIC BLOOD PRESSURE: 95 MMHG | BODY MASS INDEX: 36.82 KG/M2 | DIASTOLIC BLOOD PRESSURE: 62 MMHG | HEIGHT: 61 IN | OXYGEN SATURATION: 94 % | WEIGHT: 195 LBS

## 2018-11-16 DIAGNOSIS — J45.40 MODERATE PERSISTENT ASTHMA WITHOUT COMPLICATION: ICD-10-CM

## 2018-11-16 DIAGNOSIS — E66.01 SEVERE OBESITY WITH BODY MASS INDEX (BMI) OF 35.0 TO 39.9 WITH SERIOUS COMORBIDITY (HCC): ICD-10-CM

## 2018-11-16 DIAGNOSIS — I50.9 CHRONIC CONGESTIVE HEART FAILURE, UNSPECIFIED HEART FAILURE TYPE (HCC): Primary | ICD-10-CM

## 2018-11-16 DIAGNOSIS — N18.30 STAGE 3 CHRONIC KIDNEY DISEASE (HCC): ICD-10-CM

## 2018-11-16 DIAGNOSIS — I48.19 PERSISTENT ATRIAL FIBRILLATION (HCC): ICD-10-CM

## 2018-11-16 NOTE — PROGRESS NOTES
Chief Complaint Patient presents with  Follow-up 1. Have you been to the ER, urgent care clinic since your last visit? Hospitalized since your last visit? No 
 
2. Have you seen or consulted any other health care providers outside of the [address] [place] [address] since your last visit? Include any pap smears or colon screening.  No

## 2018-11-16 NOTE — PROGRESS NOTES
History of Present Illness: Chief Complaint Patient presents with  Follow-up Pt is a 80y.o. year old female Presents to clinic for follow up of chronic conditions. Moderate, persistent asthma: 
- Followed by [facility] - Last visit 9/2018 - Prescribed Breo and PRN Albuterol - Did not tolerate the [facility] - Currently taking the Flovent and PRN Albuterol - Uses O2 at night - Feels that her breathing is doing well Atrial fib/ CHF: 
- Followed by Cardiology, Dr. Trey Kc.  
- She has regular follow up - No recent medication changes - Compliant with current medication Taking Remeron for sleep/ insomnia. Works well. Due for 2nd Shingrix vaccine. Leaves for [place] next month for the next 3-4 months. Followed by [facility]. Has an eye appointment coming up 11/2018. Followed every 6 months. Last DEXA in 2017. Per daughter, she has osteoporosis. Only taking Calcium and Vitamin D. Past Medical History:  
Diagnosis Date  Asthma  Atrial fibrillation ([address]  Congestive heart failure ([address] Current Outpatient Medications on File Prior to Visit Medication Sig Dispense Refill  fluticasone (FLOVENT HFA) 220 mcg/actuation inhaler Take 2 Puffs by inhalation two (2) times a day. 1 Inhaler 3  
 albuterol (PROVENTIL HFA, VENTOLIN HFA, PROAIR HFA) 90 mcg/actuation inhaler Take 2 Puffs by inhalation every four (4) hours as needed for Wheezing. 1 Inhaler 3  
 cyanocobalamin (VITAMIN B-12) 1,000 mcg sublingual tablet Take 1,000 mcg by mouth daily.  spironolactone (ALDACTONE) 25 mg tablet Take 1 Tab by mouth two (2) times a day. 60 Tab 6  
 acetaminophen (TYLENOL EXTRA STRENGTH PO) Take  by mouth. Take 1000mg in am & pm , 500mg mid day  Oxygen 2l via nasal cannula at night  mirtazapine (REMERON) 15 mg tablet Take 2 Tabs by mouth nightly. 180 Tab 1  
 apixaban (ELIQUIS) 5 mg tablet Take 1 Tab by mouth two (2) times a day.  180 Tab 2  
  furosemide (LASIX) 80 mg tablet Take 1 Tab by mouth daily. 90 Tab 1  
 montelukast (SINGULAIR) 10 mg tablet Take 1 Tab by mouth daily. 90 Tab 1  
 simvastatin (ZOCOR) 20 mg tablet Take 1 Tab by mouth nightly. 90 Tab 1  cholecalciferol (VITAMIN D3) 1,000 unit tablet Take  by mouth daily.  melatonin tab tablet Take 5 mg by mouth nightly.  docusate sodium (STOOL SOFTENER PO) Take  by mouth as needed.  ferrous sulfate 325 mg (65 mg iron) tablet Take  by mouth Daily (before breakfast).  metOLazone (ZAROXOLYN) 2.5 mg tablet Take 2.5 mg by mouth daily. For weight gain 3 lbs  multivitamin (ONE A DAY) tablet Take 1 Tab by mouth daily.  calcium carbonate (OS-JUSTIN) 500 mg calcium (1,250 mg) tablet Take 1,200 Tabs by mouth daily. No current facility-administered medications on file prior to visit. Allergies: Allergies Allergen Reactions  Azithromycin Unknown (comments)  Morphine Itching Review of Systems: 
Denies fever, chills, sweats Denies chest pain, RICH, palpitations, LE edema, lightheadedness Denies numbness/ tingling/ weakness in extremities Objective:  
 
Vitals:  
 11/16/18 1320 11/16/18 1414 BP: (!) 83/54 95/62 Pulse: 86 88 Resp: 18 Temp: 97.5 °F (36.4 °C) TempSrc: Oral   
SpO2: 94% Weight: 195 lb (88.5 kg) Height: 5' 1\" (1.549 m) Physical Exam: 
General appearance - alert, well appearing, and in no distress Mental status - alert, oriented to person, place, and time Chest - clear to auscultation, no wheezes, rales or rhonchi, symmetric air entry Heart - S1 and S2 normal, no murmurs noted, irregularly irregular rhythm Extremities - Warm, dry skin, trace edema bilaterally Recent Labs: 
No results found for this or any previous visit (from the past 12 hour(s)). Assessment and Plan:  
Pt is a 80y.o. year old female, 
 
  ICD-10-CM ICD-9-CM 1. Chronic congestive heart failure, unspecified heart failure type (HCC) I50.9 428.0 2. Persistent atrial fibrillation (HCC) I48.1 427.31   
3. Moderate persistent asthma without complication V84.25 316.46   
4. Stage 3 chronic kidney disease (HCC) N18.3 585.3 5. Severe obesity with body mass index (BMI) of 35.0 to 39.9 with serious comorbidity ([facility]) E66.01 278.01 Continue current medications No need for refills at this time Pt will continue to get prescriptions via mail order while away She will be in [place] until summer 2019 Shingrix vaccine on backorder; pt due for 2nd vaccine DEXA completed previously Daughter to bring in records Due for eye exam 
Pt to have records sent to office Follow up upon returning to [place] in 2019. Valentina Moore MD 
 
 
I have discussed the diagnosis with the patient and the intended plan as seen in the above orders. The patient has received an after-visit summary and questions were answered concerning future plans.

## 2018-11-28 RX ORDER — SIMVASTATIN 20 MG/1
20 TABLET, FILM COATED ORAL
Qty: 90 TAB | Refills: 1 | Status: SHIPPED | OUTPATIENT
Start: 2018-11-28 | End: 2019-05-13 | Stop reason: SDUPTHER

## 2018-11-28 RX ORDER — MONTELUKAST SODIUM 10 MG/1
10 TABLET ORAL DAILY
Qty: 90 TAB | Refills: 1 | Status: SHIPPED | OUTPATIENT
Start: 2018-11-28 | End: 2019-02-22 | Stop reason: SDUPTHER

## 2018-11-29 RX ORDER — FLUTICASONE PROPIONATE 220 UG/1
2 AEROSOL, METERED RESPIRATORY (INHALATION) 2 TIMES DAILY
Qty: 1 INHALER | Refills: 3 | Status: SHIPPED | OUTPATIENT
Start: 2018-11-29

## 2018-11-29 RX ORDER — ALBUTEROL SULFATE 90 UG/1
2 AEROSOL, METERED RESPIRATORY (INHALATION)
Qty: 1 INHALER | Refills: 3 | Status: SHIPPED | OUTPATIENT
Start: 2018-11-29

## 2018-12-03 ENCOUNTER — TELEPHONE (OUTPATIENT)
Dept: FAMILY MEDICINE CLINIC | Age: 83
End: 2018-12-03

## 2018-12-03 DIAGNOSIS — Z23 NEED FOR SHINGLES VACCINE: Primary | ICD-10-CM

## 2018-12-03 NOTE — TELEPHONE ENCOUNTER
Returned the call and she said the patient is out of state at this time. She was informed the office is out of this vaccine. Now she wants to know what is the deadline for receiving this vaccine.

## 2018-12-03 NOTE — TELEPHONE ENCOUNTER
----- Message from Leena De La Torre sent at 12/3/2018  8:49 AM EST -----  Regarding: Dr. Grace Zacarias Telephone  Ravin Lazcano, pt's daughter is requesting to have a prescription for the 2nd series of the pt's shingles vaccine. The pt is currently out of town and she is needing the shot.  Phone: [phone]

## 2018-12-04 NOTE — TELEPHONE ENCOUNTER
Ciara/telephone   Received: Today   Message Contents   [address] Office             Pts daughter Vivian Mtz stated her mother is out of town and she is requesting to know if you could pick the order up to get the second shingle shot if it is not to late for the second one. She will have it done in [facility] or in [place]. Ms Kofi Martinez number is [phone]. She called yesterday

## 2018-12-04 NOTE — TELEPHONE ENCOUNTER
There needs to be 2 months between initial Shingrix vaccine and the 2nd. Will print script for vaccine, though she may not need a script to have it done at the pharmacy.      Gray Cummings MD

## 2018-12-04 NOTE — TELEPHONE ENCOUNTER
This writer contacted [email or web address], patient's daughter. Verified on HIPPA to release all medical information. Two patient identifiers confirmed. This writer notified Ms. Mcwilliams, prescription for Shingrix is available at the  for . Informed Ms. Mcwilliams of the immunization schedule for Shingrix. Understanding verbalized. No further questions or concerns voiced. No further action required.

## 2018-12-20 ENCOUNTER — TELEPHONE (OUTPATIENT)
Dept: FAMILY MEDICINE CLINIC | Age: 83
End: 2018-12-20

## 2018-12-20 DIAGNOSIS — M81.6 LOCALIZED OSTEOPOROSIS WITHOUT CURRENT PATHOLOGICAL FRACTURE: Primary | ICD-10-CM

## 2018-12-20 DIAGNOSIS — M81.0 AGE-RELATED OSTEOPOROSIS WITHOUT CURRENT PATHOLOGICAL FRACTURE: ICD-10-CM

## 2018-12-20 RX ORDER — ALENDRONATE SODIUM 10 MG/1
10 TABLET ORAL
Qty: 90 TAB | Refills: 3 | Status: SHIPPED | OUTPATIENT
Start: 2018-12-20 | End: 2019-10-10 | Stop reason: SDUPTHER

## 2018-12-20 NOTE — TELEPHONE ENCOUNTER
Called and spoke with Kate Vang (daughter, listed on information release form)  Patient ID'd x 2    Discussed recently received DEXA scan results from [facility]. Study completed in 2017; however, patient's daughter was not aware of the result or diagnosis. Based on the results, the patient has osteoporosis of the left femoral neck and osteopenia of right femoral neck. 19% 10 year risk of major osteopetrotic fracture, and 6.9% risk of hip fracture. Discussed treatment options. Recommended Alendronate daily as this reduces the risk of both hip and vertebral fractures. Informed of side effects which include GI upset (instructed to sit upright for 30 min - 1 hr after taking, take with full glass of water) and rarely jaw necrosis. Orville Cardona was in agreement with plan and will call to discuss further with her mother, who is currently away. Will send medication to mail in pharmacy in anticipation of starting medication. Answered all questions.      Jayy Jackson MD

## 2019-01-15 RX ORDER — MIRTAZAPINE 15 MG/1
TABLET, FILM COATED ORAL
Qty: 180 TAB | Refills: 1 | Status: SHIPPED | OUTPATIENT
Start: 2019-01-15 | End: 2019-08-12 | Stop reason: SDUPTHER

## 2019-02-08 RX ORDER — FUROSEMIDE 80 MG/1
TABLET ORAL
Qty: 90 TAB | Refills: 1 | Status: SHIPPED | OUTPATIENT
Start: 2019-02-08 | End: 2019-10-15 | Stop reason: SDUPTHER

## 2019-02-22 RX ORDER — MONTELUKAST SODIUM 10 MG/1
10 TABLET ORAL DAILY
Qty: 90 TAB | Refills: 1 | Status: SHIPPED | OUTPATIENT
Start: 2019-02-22 | End: 2019-07-29 | Stop reason: SDUPTHER

## 2019-05-01 PROBLEM — J45.50 SEVERE PERSISTENT ASTHMA WITHOUT COMPLICATION: Status: ACTIVE | Noted: 2018-08-16

## 2019-05-13 RX ORDER — SIMVASTATIN 20 MG/1
TABLET, FILM COATED ORAL
Qty: 90 TAB | Refills: 1 | Status: SHIPPED | OUTPATIENT
Start: 2019-05-13

## 2019-05-13 RX ORDER — APIXABAN 5 MG/1
TABLET, FILM COATED ORAL
Qty: 180 TAB | Refills: 2 | Status: SHIPPED | OUTPATIENT
Start: 2019-05-13

## 2019-05-15 ENCOUNTER — OFFICE VISIT (OUTPATIENT)
Dept: FAMILY MEDICINE CLINIC | Age: 84
End: 2019-05-15

## 2019-05-15 VITALS
HEIGHT: 61 IN | HEART RATE: 77 BPM | SYSTOLIC BLOOD PRESSURE: 106 MMHG | RESPIRATION RATE: 16 BRPM | TEMPERATURE: 95.8 F | OXYGEN SATURATION: 97 % | WEIGHT: 203 LBS | BODY MASS INDEX: 38.33 KG/M2 | DIASTOLIC BLOOD PRESSURE: 63 MMHG

## 2019-05-15 DIAGNOSIS — G89.29 OTHER CHRONIC PAIN: ICD-10-CM

## 2019-05-15 DIAGNOSIS — I48.19 PERSISTENT ATRIAL FIBRILLATION (HCC): ICD-10-CM

## 2019-05-15 DIAGNOSIS — E78.2 MIXED HYPERLIPIDEMIA: ICD-10-CM

## 2019-05-15 DIAGNOSIS — D50.9 IRON DEFICIENCY ANEMIA, UNSPECIFIED IRON DEFICIENCY ANEMIA TYPE: ICD-10-CM

## 2019-05-15 DIAGNOSIS — G47.00 INSOMNIA, UNSPECIFIED TYPE: ICD-10-CM

## 2019-05-15 DIAGNOSIS — J45.20 MILD INTERMITTENT ASTHMA WITHOUT COMPLICATION: ICD-10-CM

## 2019-05-15 DIAGNOSIS — N39.41 URGE INCONTINENCE: ICD-10-CM

## 2019-05-15 DIAGNOSIS — M81.0 AGE-RELATED OSTEOPOROSIS WITHOUT CURRENT PATHOLOGICAL FRACTURE: ICD-10-CM

## 2019-05-15 DIAGNOSIS — I50.9 CHRONIC CONGESTIVE HEART FAILURE, UNSPECIFIED HEART FAILURE TYPE (HCC): Primary | ICD-10-CM

## 2019-05-15 DIAGNOSIS — N18.30 STAGE 3 CHRONIC KIDNEY DISEASE (HCC): ICD-10-CM

## 2019-05-15 NOTE — PATIENT INSTRUCTIONS
You can schedule a visit with Sports Medicine if you need a second opinion regarding your chronic pain (Dr. Helena Roman)    Stop using the albuterol twice a day and only use as needed    Jillian Puentes in [place], [place]

## 2019-05-15 NOTE — PROGRESS NOTES
[address] Medicine Residency Attending Addendum:  Patient encounter was discussed on the day of the encounter with Jg Mitchell MD, performing the key elements of the service. I discussed the findings, assessment and plan with the resident and agree with the resident's findings and plan as documented in the resident's note.       Duane Seeds, MD, CAQSM, RMSK

## 2019-05-15 NOTE — PROGRESS NOTES
HPI       Chief Complaint: Form completion for adult , discuss chronic problems     Val Jernigan is a 80 y.o. female who presents for form completion for adult  and discuss chronic problems. Will be going to [facility] [place] 2 days a week, through the end of June. After that is moving to [place] for 6 months, then [place] for 3 months, then back to [address] [place] [address] for 3 months. Currently lives with daughter Halima Wheeler. Osteoporosis - On fosamax, started 12/2018  HLD - simvastatin 20mg daily  Afib - see Dr. Luis York (Cardiology), on Eliquis 5mg BID  CHF - sees Dr. Luis York (Cardiology) - on lasix 80mg daily and aldactone 25mg BID. Takes xarolxolyn 2.5mg if weight gain exceeds 3 lbs in 1 day. Has not used in over a month. Asthma - singulair 10mg daily, flovent inhaler 220mg BID, and albuterol inhaler PRN (had been using BID for years until they saw pulm last year and was informed it was PRN, is still using BID because she feels it is working). No nighttime awakenings. Insomnia - mirtazapine 30 mg QHS, melatonin 5mg qhs  Urge incontinence - myrbetriq, started 2 days ago (by Urology, Mariano Kamara), 14 days on 25mg daily, then 14 days on 50mg daily, then f/u in 1 month  Chronic Pain - Tylenol 3000mg daily for chronic back pain, knee pain. Seeing Dr. Omero Ambriz, possible pain specialist  Other - taking ferrous sulfate, calcium/Vit D supplement TID, daily multivitamin     PMHx:  Past Medical History:   Diagnosis Date    Asthma     Atrial fibrillation (HCC)     Congestive heart failure (HCC)      Meds:   Current Outpatient Medications   Medication Sig Dispense Refill    LUTEIN PO Take  by mouth.  mirabegron ER (MYRBETRIQ) 50 mg ER tablet Take 50 mg by mouth daily.  Lactobacillus acidophilus (PROBIOTIC PO) Take  by mouth.       simvastatin (ZOCOR) 20 mg tablet TAKE 1 TABLET BY MOUTH  NIGHTLY 90 Tab 1    ELIQUIS 5 mg tablet TAKE 1 TABLET BY MOUTH TWO  TIMES DAILY 180 Tab 2    montelukast (SINGULAIR) 10 mg tablet Take 1 Tab by mouth daily. 90 Tab 1    furosemide (LASIX) 80 mg tablet TAKE 1 TABLET BY MOUTH  DAILY 90 Tab 1    mirtazapine (REMERON) 15 mg tablet TAKE 2 TABLETS BY MOUTH  NIGHTLY 180 Tab 1    alendronate (FOSAMAX) 10 mg tablet Take 1 Tab by mouth Daily (before breakfast). 90 Tab 3    fluticasone (FLOVENT HFA) 220 mcg/actuation inhaler Take 2 Puffs by inhalation two (2) times a day. 1 Inhaler 3    spironolactone (ALDACTONE) 25 mg tablet Take 1 Tab by mouth two (2) times a day. 60 Tab 6    acetaminophen (TYLENOL EXTRA STRENGTH PO) Take  by mouth. Take 1000mg in am & pm , 500mg mid day      Oxygen 2l via nasal cannula at night      melatonin tab tablet Take 5 mg by mouth nightly.  ferrous sulfate 325 mg (65 mg iron) tablet Take  by mouth Daily (before breakfast).  metOLazone (ZAROXOLYN) 2.5 mg tablet Take 2.5 mg by mouth daily. For weight gain 3 lbs      multivitamin (ONE A DAY) tablet Take 1 Tab by mouth daily.  calcium carbonate (OS-JUSTIN) 500 mg calcium (1,250 mg) tablet Take 1,200 Tabs by mouth daily.  albuterol (PROVENTIL HFA, VENTOLIN HFA, PROAIR HFA) 90 mcg/actuation inhaler Take 2 Puffs by inhalation every four (4) hours as needed for Wheezing. 1 Inhaler 3    cyanocobalamin (VITAMIN B-12) 1,000 mcg sublingual tablet Take 1,000 mcg by mouth daily.  cholecalciferol (VITAMIN D3) 1,000 unit tablet Take  by mouth daily.  docusate sodium (STOOL SOFTENER PO) Take  by mouth as needed. Allergies: Allergies   Allergen Reactions    Morphine Itching     ROS  Review of Systems   Constitutional: Negative for chills, fever and weight loss. HENT: Negative for congestion, sinus pain and sore throat. Eyes: Negative for blurred vision and double vision. Respiratory: Negative for cough, shortness of breath and wheezing. Cardiovascular: Negative for chest pain and palpitations.    Gastrointestinal: Negative for abdominal pain, constipation, diarrhea, nausea and vomiting. Genitourinary: Negative for dysuria, frequency and urgency. Neurological: Negative for dizziness, weakness and headaches. Physical Exam:  Visit Vitals  /63   Pulse 77   Temp 95.8 °F (35.4 °C) (Oral)   Resp 16   Ht 5' 1\" (1.549 m)   Wt 203 lb (92.1 kg)   SpO2 97%   BMI 38.36 kg/m²     Physical Exam   Constitutional: She is oriented to person, place, and time. She appears well-developed and well-nourished. HENT:   Head: Normocephalic and atraumatic. Eyes: EOM are normal.   Neck: Normal range of motion. Neck supple. No thyromegaly present. Cardiovascular: Normal rate. No murmur heard. Irregularly irregular rhythm   Pulmonary/Chest: Effort normal and breath sounds normal. No respiratory distress. She has no wheezes. She has no rales. Abdominal: Soft. Bowel sounds are normal. She exhibits no distension. There is no tenderness. Neurological: She is alert and oriented to person, place, and time. Skin: Skin is warm and dry. Psychiatric: She has a normal mood and affect. Vitals reviewed. Assessment     80 y.o. female with:    ICD-10-CM ICD-9-CM    1. Chronic congestive heart failure, unspecified heart failure type (HCC) I50.9 428.0    2. Persistent atrial fibrillation (HCC) I48.1 427.31    3. Stage 3 chronic kidney disease (HCC) N18.3 585.3    4. Age-related osteoporosis without current pathological fracture M81.0 733.01 VITAMIN D, 25 HYDROXY   5. Iron deficiency anemia, unspecified iron deficiency anemia type D50.9 280.9    6. Insomnia, unspecified type G47.00 780.52    7. Mixed hyperlipidemia E78.2 272.2 CBC W/O DIFF      LIPID PANEL      METABOLIC PANEL, COMPREHENSIVE   8. Mild intermittent asthma without complication T45.76 505.91    9. Urge incontinence N39.41 788.31    10. Other chronic pain G89.29 338.29               Plan     1.  Chronic congestive heart failure, unspecified heart failure type ([address]  Stable, followed by cardiology - unsure why pt not on ACEi tatianna with CKD Stage III, can follow up. - Continue lasix and aldactone     2. Persistent atrial fibrillation (HCC)  Stable, rate controlled, followed by cardiology  - Continue eliquis     3. Stage 3 chronic kidney disease (HCC)  Stable, checking labs today    4. Age-related osteoporosis without current pathological fracture  Started on fosamax 12/2018, stable. - VITAMIN D, 25 HYDROXY    5. Iron deficiency anemia, unspecified iron deficiency anemia type  Stable, checking labs today. 6. Insomnia, unspecified type  Stable, well controlled on melatonin mirtazapine    7. Mixed hyperlipidemia  Checking labs today   - CBC W/O DIFF  - LIPID PANEL  - METABOLIC PANEL, COMPREHENSIVE    8. Chronic pain   Followed by pain management. Stable. Patient considering scheduling appt with Sports Medicine at [facility] for second opinion. 9. Mild intermittent asthma  Stable, well controlled. Continue singulair and flovent. D/w patient that albuterol is intended as a PRN medication, recommended discontinuing BID usage. Discussed that in the event of an acute exacerbation, if she is using albuterol daily her body is likely resistant to it and rescue medications will not be as effective. She expressed understanding. 10. Urge incontinence   Followed by Urology, started on medication recently. Paperwork done for Adult   Patient seen and discussed with Dr. Trent White (attending physician)    I have discussed the diagnosis with the patient and the intended plan as seen in the above orders. The patient has received an after-visit summary and questions were answered concerning future plans. I have discussed medication side effects and warnings with the patient as well.     Mildred Mace MD  Family Medicine Resident  PGY-2

## 2019-05-15 NOTE — PROGRESS NOTES
Chief Complaint   Patient presents with    Form Completion     for adult     PPD Placement     1. Have you been to the ER, urgent care clinic since your last visit? Hospitalized since your last visit? Yes. CHF and UTI. 2. Have you seen or consulted any other health care providers outside of the [address] [place] [address] since your last visit? Include any pap smears or colonoscopy.   No

## 2019-05-16 LAB
25(OH)D3+25(OH)D2 SERPL-MCNC: 60.9 NG/ML (ref 30–100)
ALBUMIN SERPL-MCNC: 4.7 G/DL (ref 3.5–4.7)
ALBUMIN/GLOB SERPL: 2.4 {RATIO} (ref 1.2–2.2)
ALP SERPL-CCNC: 73 IU/L (ref 39–117)
ALT SERPL-CCNC: 11 IU/L (ref 0–32)
AST SERPL-CCNC: 19 IU/L (ref 0–40)
BILIRUB SERPL-MCNC: 0.4 MG/DL (ref 0–1.2)
BUN SERPL-MCNC: 25 MG/DL (ref 8–27)
BUN/CREAT SERPL: 24 (ref 12–28)
CALCIUM SERPL-MCNC: 8.8 MG/DL (ref 8.7–10.3)
CHLORIDE SERPL-SCNC: 99 MMOL/L (ref 96–106)
CHOLEST SERPL-MCNC: 136 MG/DL (ref 100–199)
CO2 SERPL-SCNC: 25 MMOL/L (ref 20–29)
CREAT SERPL-MCNC: 1.05 MG/DL (ref 0.57–1)
ERYTHROCYTE [DISTWIDTH] IN BLOOD BY AUTOMATED COUNT: 13.9 % (ref 12.3–15.4)
GLOBULIN SER CALC-MCNC: 2 G/DL (ref 1.5–4.5)
GLUCOSE SERPL-MCNC: 96 MG/DL (ref 65–99)
HCT VFR BLD AUTO: 41.5 % (ref 34–46.6)
HDLC SERPL-MCNC: 59 MG/DL
HGB BLD-MCNC: 13.7 G/DL (ref 11.1–15.9)
INTERPRETATION, 910389: NORMAL
INTERPRETATION: NORMAL
LDLC SERPL CALC-MCNC: 59 MG/DL (ref 0–99)
MCH RBC QN AUTO: 32.6 PG (ref 26.6–33)
MCHC RBC AUTO-ENTMCNC: 33 G/DL (ref 31.5–35.7)
MCV RBC AUTO: 99 FL (ref 79–97)
PDF IMAGE, 910387: NORMAL
PLATELET # BLD AUTO: 250 X10E3/UL (ref 150–379)
POTASSIUM SERPL-SCNC: 4.5 MMOL/L (ref 3.5–5.2)
PROT SERPL-MCNC: 6.7 G/DL (ref 6–8.5)
RBC # BLD AUTO: 4.2 X10E6/UL (ref 3.77–5.28)
SODIUM SERPL-SCNC: 142 MMOL/L (ref 134–144)
TRIGL SERPL-MCNC: 89 MG/DL (ref 0–149)
VLDLC SERPL CALC-MCNC: 18 MG/DL (ref 5–40)
WBC # BLD AUTO: 6.5 X10E3/UL (ref 3.4–10.8)

## 2019-05-22 ENCOUNTER — TELEPHONE (OUTPATIENT)
Dept: FAMILY MEDICINE CLINIC | Age: 84
End: 2019-05-22

## 2019-05-22 NOTE — TELEPHONE ENCOUNTER
----- Message from Muna Minaya sent at 5/22/2019 10:11 AM EDT -----  Regarding: [place]/telephone  Pts daughter Jyothi Cast is requesting her mothers lab results. Daughters number is [phone].

## 2019-05-30 NOTE — TELEPHONE ENCOUNTER
Patients daughter Lan Estrada is calling for lab results. Spoke to nurse Ralph Poole and was informed to tell Lan Estrada she will give her a call back this afternoon.

## 2019-05-31 NOTE — TELEPHONE ENCOUNTER
Called and spoke with patient's daughter Chang Quarles, name listed on release of information. Patient's daughter calling to inquire about Vit-D. Results given. Also requested that letter be re-mailed to address. Letter printed and placed to be mailed.

## 2019-06-13 ENCOUNTER — DOCUMENTATION ONLY (OUTPATIENT)
Dept: FAMILY MEDICINE CLINIC | Age: 84
End: 2019-06-13

## 2019-06-13 NOTE — PROGRESS NOTES
Received courtesy fax from [facility] (Kassie Franklin MD) for office visit 6/10 for chronic cystitis. Per note patient had no improvement in sxs with [place]. Is on lasix 40mg BID and is on timed voids q2h. Patient with plans to return to [place] in 3 weeks, she was strongly encouraged to f/u as growths in bladder likely affecting frequency.      Will place on scan tianna Mace MD

## 2019-07-29 RX ORDER — MONTELUKAST SODIUM 10 MG/1
TABLET ORAL
Qty: 90 TAB | Refills: 1 | Status: SHIPPED | OUTPATIENT
Start: 2019-07-29 | End: 2019-10-11 | Stop reason: SDUPTHER

## 2019-08-09 RX ORDER — SPIRONOLACTONE 25 MG/1
TABLET ORAL
Qty: 60 TAB | Refills: 6 | Status: SHIPPED | OUTPATIENT
Start: 2019-08-09 | End: 2019-08-13

## 2019-08-12 RX ORDER — MIRTAZAPINE 15 MG/1
TABLET, FILM COATED ORAL
Qty: 180 TAB | Refills: 1 | Status: SHIPPED | OUTPATIENT
Start: 2019-08-12 | End: 2019-10-18 | Stop reason: SDUPTHER

## 2019-08-12 RX ORDER — SPIRONOLACTONE 25 MG/1
25 TABLET ORAL 2 TIMES DAILY
Qty: 60 TAB | Refills: 6 | OUTPATIENT
Start: 2019-08-12

## 2019-08-13 RX ORDER — SPIRONOLACTONE 25 MG/1
TABLET ORAL
Qty: 180 TAB | Refills: 3 | Status: SHIPPED | OUTPATIENT
Start: 2019-08-13

## 2019-10-08 DIAGNOSIS — M81.6 LOCALIZED OSTEOPOROSIS WITHOUT CURRENT PATHOLOGICAL FRACTURE: ICD-10-CM

## 2019-10-08 RX ORDER — ALENDRONATE SODIUM 10 MG/1
10 TABLET ORAL
Qty: 90 TAB | Refills: 3 | OUTPATIENT
Start: 2019-10-08

## 2019-10-08 RX ORDER — MIRTAZAPINE 15 MG/1
TABLET, FILM COATED ORAL
Qty: 180 TAB | Refills: 1 | OUTPATIENT
Start: 2019-10-08

## 2019-10-10 DIAGNOSIS — M81.6 LOCALIZED OSTEOPOROSIS WITHOUT CURRENT PATHOLOGICAL FRACTURE: ICD-10-CM

## 2019-10-11 DIAGNOSIS — M81.6 LOCALIZED OSTEOPOROSIS WITHOUT CURRENT PATHOLOGICAL FRACTURE: ICD-10-CM

## 2019-10-11 DIAGNOSIS — J45.50 SEVERE PERSISTENT ASTHMA WITHOUT COMPLICATION: Primary | ICD-10-CM

## 2019-10-11 NOTE — TELEPHONE ENCOUNTER
----- Message from Evelyn Titus sent at 10/11/2019  3:37 PM EDT -----  Regarding: DR Misti Henning / REFILL  Medication Refill      Best contact number(s):  [phone]    \"FOSAMAX\" 10 MG   \"SINGULAIR\"  10 MG       Is patient out of this medication (yes/no):  [facility] [address]: [phone]    Details to clarify the request:      Evelyn Titus

## 2019-10-11 NOTE — TELEPHONE ENCOUNTER
Patient called for status of refill. I did let her know that she is requesting medications to soon and she then states \"well\" I can't find my medications,\" I lost them\".

## 2019-10-12 RX ORDER — ALENDRONATE SODIUM 10 MG/1
TABLET ORAL
Qty: 90 TAB | Refills: 0 | Status: SHIPPED | OUTPATIENT
Start: 2019-10-12 | End: 2019-10-18 | Stop reason: SDUPTHER

## 2019-10-14 NOTE — TELEPHONE ENCOUNTER
[phone]  Son called on this request of last week 10/8. He wants to know why it is taking so long to get these medications filled. (he said it was already past 72 hour refill policy)    He was informed fosamax was ordered but wanted to know why the singular has not been ordered.

## 2019-10-15 RX ORDER — ALENDRONATE SODIUM 10 MG/1
TABLET ORAL
Qty: 90 TAB | Refills: 0 | Status: CANCELLED | OUTPATIENT
Start: 2019-10-15

## 2019-10-15 RX ORDER — FUROSEMIDE 80 MG/1
TABLET ORAL
Qty: 90 TAB | Refills: 1 | Status: SHIPPED | OUTPATIENT
Start: 2019-10-15 | End: 2019-10-18 | Stop reason: SDUPTHER

## 2019-10-18 RX ORDER — ALENDRONATE SODIUM 10 MG/1
TABLET ORAL
Qty: 90 TAB | Refills: 0 | Status: SHIPPED | OUTPATIENT
Start: 2019-10-18

## 2019-10-18 RX ORDER — MONTELUKAST SODIUM 10 MG/1
TABLET ORAL
Qty: 90 TAB | Refills: 1 | Status: SHIPPED | OUTPATIENT
Start: 2019-10-18

## 2019-10-18 RX ORDER — FUROSEMIDE 80 MG/1
TABLET ORAL
Qty: 90 TAB | Refills: 1 | Status: SHIPPED | OUTPATIENT
Start: 2019-10-18

## 2019-10-18 RX ORDER — MIRTAZAPINE 15 MG/1
TABLET, FILM COATED ORAL
Qty: 180 TAB | Refills: 1 | Status: SHIPPED | OUTPATIENT
Start: 2019-10-18

## 2020-05-07 ENCOUNTER — TELEPHONE (OUTPATIENT)
Dept: FAMILY MEDICINE CLINIC | Age: 85
End: 2020-05-07

## 2020-05-07 NOTE — TELEPHONE ENCOUNTER
I called [facility] to touched base with them in regards to any needs they may have. Daughter Armando Rowland) Ashwini Dong states patient moved to [place] and is not planning to return to [place] for quite some time.        Bernabe Loo MD  05/07/20  10:42 AM

## 2022-03-18 PROBLEM — I48.19 PERSISTENT ATRIAL FIBRILLATION (HCC): Status: ACTIVE | Noted: 2018-08-16

## 2022-03-18 PROBLEM — E66.01 SEVERE OBESITY WITH BODY MASS INDEX (BMI) OF 35.0 TO 39.9 WITH SERIOUS COMORBIDITY (HCC): Status: ACTIVE | Noted: 2018-08-16

## 2022-03-18 PROBLEM — N18.30 STAGE 3 CHRONIC KIDNEY DISEASE (HCC): Status: ACTIVE | Noted: 2018-08-16

## 2022-03-19 PROBLEM — M81.0 AGE-RELATED OSTEOPOROSIS WITHOUT CURRENT PATHOLOGICAL FRACTURE: Status: ACTIVE | Noted: 2018-12-20

## 2022-03-19 PROBLEM — I50.9 CHRONIC CONGESTIVE HEART FAILURE (HCC): Status: ACTIVE | Noted: 2018-08-16

## 2022-03-19 PROBLEM — J45.50 SEVERE PERSISTENT ASTHMA WITHOUT COMPLICATION: Status: ACTIVE | Noted: 2018-08-16

## 2022-09-08 NOTE — ANESTHESIA PROCEDURE NOTES
Peripheral Block Start time: 9/26/2018 11:45 AM 
End time: 9/26/2018 11:52 AM 
Performed by: Samy Tejeda Authorized by: Rahel Monroy Pre-procedure: Indications: at surgeon's request and primary anesthetic Preanesthetic Checklist: patient identified, risks and benefits discussed, site marked, timeout performed, anesthesia consent given and patient being monitored Timeout Time: 11:45 Block Type:  
Block Type:  Supraclavicular Laterality:  Left Monitoring:  Continuous pulse ox, frequent vital sign checks, heart rate, responsive to questions and oxygen Injection Technique:  Single shot Procedures: ultrasound guided and nerve stimulator Patient Position: supine Prep: chlorhexidine Location:  Supraclavicular Needle Type:  Stimuplex Needle Gauge:  22 G Needle Localization:  Anatomical landmarks and ultrasound guidance Medication Injected:  2.0% 
mepivacaine Volume (mL):  30 
 
Assessment: 
Number of attempts:  1 Injection Assessment:  Incremental injection every 5 mL, local visualized surrounding nerve on ultrasound, negative aspiration for blood, no paresthesia and no intravascular symptoms Patient tolerance:  Patient tolerated the procedure well with no immediate complications
right parotid mass, non tender

## 2023-05-02 NOTE — PATIENT INSTRUCTIONS
Viral Respiratory Infection: Care Instructions  Your Care Instructions    Viruses are very small organisms. They grow in number after they enter your body. There are many types that cause different illnesses, such as colds and the mumps. The symptoms of a viral respiratory infection often start quickly. They include a fever, sore throat, and runny nose. You may also just not feel well. Or you may not want to eat much. Most viral respiratory infections are not serious. They usually get better with time and self-care. Antibiotics are not used to treat a viral infection. That's because antibiotics will not help cure a viral illness. In some cases, antiviral medicine can help your body fight a serious viral infection. Follow-up care is a key part of your treatment and safety. Be sure to make and go to all appointments, and call your doctor if you are having problems. It's also a good idea to know your test results and keep a list of the medicines you take. How can you care for yourself at home? · Rest as much as possible until you feel better. · Be safe with medicines. Take your medicine exactly as prescribed. Call your doctor if you think you are having a problem with your medicine. You will get more details on the specific medicine your doctor prescribes. · Take an over-the-counter pain medicine, such as acetaminophen (Tylenol), ibuprofen (Advil, Motrin), or naproxen (Aleve), as needed for pain and fever. Read and follow all instructions on the label. Do not give aspirin to anyone younger than 20. It has been linked to Reye syndrome, a serious illness. · Drink plenty of fluids, enough so that your urine is light yellow or clear like water. Hot fluids, such as tea or soup, may help relieve congestion in your nose and throat. If you have kidney, heart, or liver disease and have to limit fluids, talk with your doctor before you increase the amount of fluids you drink.   · Try to clear mucus from your lungs by breathing deeply and coughing. · Gargle with warm salt water once an hour. This can help reduce swelling and throat pain. Use 1 teaspoon of salt mixed in 1 cup of warm water. · Do not smoke or allow others to smoke around you. If you need help quitting, talk to your doctor about stop-smoking programs and medicines. These can increase your chances of quitting for good. To avoid spreading the virus  · Cough or sneeze into a tissue. Then throw the tissue away. · If you don't have a tissue, use your hand to cover your cough or sneeze. Then clean your hand. You can also cough into your sleeve. · Wash your hands often. Use soap and warm water. Wash for 15 to 20 seconds each time. · If you don't have soap and water near you, you can clean your hands with alcohol wipes or gel. When should you call for help? Call your doctor now or seek immediate medical care if:    · You have a new or higher fever.     · Your fever lasts more than 48 hours.     · You have trouble breathing.     · You have a fever with a stiff neck or a severe headache.     · You are sensitive to light.     · You feel very sleepy or confused.    Watch closely for changes in your health, and be sure to contact your doctor if:    · You do not get better as expected. Where can you learn more? Go to http://danay-dylan.info/. Enter C108 in the search box to learn more about \"Viral Respiratory Infection: Care Instructions. \"  Current as of: December 6, 2017  Content Version: 11.8  © 6801-2666 [employer]. Care instructions adapted under license by [employer] (which disclaims liability or warranty for this information). If you have questions about a medical condition or this instruction, always ask your healthcare professional. [place] [address] any warranty or liability for your use of this information.          Managing Other Conditions When You Have Heart Failure: Care Instructions  Your Care Instructions    All the systems in your body rely on each other to work properly. Heart failure has effects all through your body that can lead to other problems, such as kidney disease. The reverse is also true. A condition like diabetes or lung disease can damage or stress your heart and cause heart failure. Managing any other problems can help reduce your heart's workload and make your heart failure better. Conditions that commonly cause or occur along with heart failure include high blood pressure, diabetes, COPD, high cholesterol, kidney problems, anemia, and arthritis. Follow-up care is a key part of your treatment and safety. Be sure to make and go to all appointments, and call your doctor if you are having problems. It's also a good idea to know your test results and keep a list of the medicines you take. How can you care for yourself at home? Steps to help with heart failure and other problems  · Eat less salt (sodium). This helps keep fluid from building up. It may help you feel better. Limiting sodium can also help if you have high blood pressure or kidney disease. · Watch your fluid intake if your doctor tells you to. Reducing fluids can ease your heart's workload and keep your sodium level in balance. · Get regular exercise. Regular, moderate exercise, such as walking, helps your heart. It can also help lower your blood pressure, lower stress, and help you lose weight. · Lose weight if you are overweight. Losing weight can help you manage diabetes, lower your blood pressure and cholesterol level, and reduce the workload on your heart. · Stop smoking. Smoking stresses your lungs, interferes with healing, and can make heart failure worse. · Limit alcohol. Alcohol can raise your blood pressure. Ask your doctor how much, if any, is safe. If your doctor has not set you up with a cardiac rehabilitation (rehab) program, talk to him or her about whether that is right for you.  Cardiac rehab includes exercise, help with diet and lifestyle changes, and emotional support. To stay as healthy as possible  · Work closely with your doctor. Have all your tests, and go to all your appointments. · Take your medicines exactly as prescribed. You will take medicines to treat the other conditions you have along with heart failure. It can be hard to balance the treatment for all your conditions. You will need to have follow-up tests to make sure that all your medicines are working well together. Talk to your doctor if you have any problems with your medicine. · Keep all your doctors informed about your health problems and all the medicines you take for them. Medicines that can treat one condition may make another condition worse. · Talk to your doctor before you take any vitamins, over-the-counter drugs, or herbal products. Do not take aspirin, ibuprofen (Advil, Motrin), or naproxen (Aleve) unless you talk to your doctor first. They could make your heart failure and other problems worse. When should you call for help? Call 911 if you have symptoms of sudden heart failure, such as:    · You have severe trouble breathing.     · You cough up pink, foamy mucus.     · You have a new irregular or rapid heartbeat.    Call 911 if you have symptoms of a heart attack. These may include:    · Chest pain or pressure, or a strange feeling in the chest.     · Sweating.     · Shortness of breath.     · Nausea or vomiting.     · Pain, pressure, or a strange feeling in the back, neck, jaw, or upper belly or in one or both shoulders or arms.     · Lightheadedness or sudden weakness.     · A fast or irregular heartbeat.    After you call 911, the  may tell you to chew 1 adult-strength or 2 to 4 low-dose aspirin. Wait for an ambulance.  Do not try to drive yourself.   Call your doctor now or seek immediate medical care if:    · You have new or increased shortness of breath.     · You are dizzy or lightheaded, or you feel like you may faint.     · You have sudden weight gain, such as more than 2 to 3 pounds in a day or 5 pounds in a week. (Your doctor may suggest a different range of weight gain.)     · You have increased swelling in your legs, ankles, or feet.     · You are suddenly so tired or weak that you cannot do your usual activities.    Watch closely for changes in your health, and be sure to contact your doctor if you develop new symptoms. Where can you learn more? Go to http://danay-dylan.info/. Enter P052 in the search box to learn more about \"Managing Other Conditions When You Have Heart Failure: Care Instructions. \"  Current as of: December 6, 2017  Content Version: 11.8  © 7007-1102 [employer]. Care instructions adapted under license by [employer] (which disclaims liability or warranty for this information). If you have questions about a medical condition or this instruction, always ask your healthcare professional. [place] [address] any warranty or liability for your use of this information. 0

## 2023-05-20 RX ORDER — SIMVASTATIN 20 MG
1 TABLET ORAL NIGHTLY
COMMUNITY
Start: 2019-05-13

## 2023-05-20 RX ORDER — ALBUTEROL SULFATE 90 UG/1
2 AEROSOL, METERED RESPIRATORY (INHALATION) EVERY 4 HOURS PRN
COMMUNITY
Start: 2018-11-29

## 2023-05-20 RX ORDER — FERROUS SULFATE 325(65) MG
TABLET ORAL
COMMUNITY

## 2023-05-20 RX ORDER — FLUTICASONE PROPIONATE 220 UG/1
2 AEROSOL, METERED RESPIRATORY (INHALATION) 2 TIMES DAILY
COMMUNITY
Start: 2018-11-29

## 2023-05-20 RX ORDER — METOLAZONE 2.5 MG/1
2.5 TABLET ORAL DAILY
COMMUNITY

## 2023-05-20 RX ORDER — FUROSEMIDE 80 MG
1 TABLET ORAL DAILY
COMMUNITY
Start: 2019-10-18

## 2023-05-20 RX ORDER — MONTELUKAST SODIUM 10 MG/1
1 TABLET ORAL DAILY
COMMUNITY
Start: 2019-10-18

## 2023-05-20 RX ORDER — IBUPROFEN 200 MG
1200 CAPSULE ORAL DAILY
COMMUNITY

## 2023-05-20 RX ORDER — CHOLECALCIFEROL (VITAMIN D3) 125 MCG
5 CAPSULE ORAL NIGHTLY
COMMUNITY

## 2023-05-20 RX ORDER — ALENDRONATE SODIUM 10 MG/1
TABLET ORAL
COMMUNITY
Start: 2019-10-18

## 2023-05-20 RX ORDER — SPIRONOLACTONE 25 MG/1
1 TABLET ORAL 2 TIMES DAILY
COMMUNITY
Start: 2019-08-13

## 2023-05-20 RX ORDER — MIRTAZAPINE 15 MG/1
2 TABLET, FILM COATED ORAL NIGHTLY
COMMUNITY
Start: 2019-10-18

## 2025-05-21 NOTE — TELEPHONE ENCOUNTER
Reviewed requested medications. Sent to listed pharmacy in [place]; pt spends part of the year with her children. 107.94

## (undated) DEVICE — DEVON™ KNEE AND BODY STRAP 60" X 3" (1.5 M X 7.6 CM): Brand: DEVON

## (undated) DEVICE — SUPER SPONGES,MEDIUM: Brand: DERMACEA

## (undated) DEVICE — HAND I-LF: Brand: MEDLINE INDUSTRIES, INC.

## (undated) DEVICE — BLADE OPHTH 180DEG CUT SURF BLU STR SHRP DBL BVL GRINDLESS

## (undated) DEVICE — SOL IRRIGATION INJ NACL 0.9% 500ML BTL

## (undated) DEVICE — SOL ANTI-FOG 6ML MEDC -- MEDICHOICE - CONVERT TO 358427

## (undated) DEVICE — STERILE POLYISOPRENE POWDER-FREE SURGICAL GLOVES: Brand: PROTEXIS

## (undated) DEVICE — 1010 S-DRAPE TOWEL DRAPE 10/BX: Brand: STERI-DRAPE™

## (undated) DEVICE — TOWEL,OR,DSP,ST,BLUE,STD,2/PK,40PK/CS: Brand: MEDLINE

## (undated) DEVICE — BANDAGE COMPR SELF ADH 5 YDX3 IN TAN NS PREMIERPRO LTX

## (undated) DEVICE — BIPOLAR FORCEPS CORD: Brand: VALLEYLAB

## (undated) DEVICE — (D)STRIP SKN CLSR 0.5X4IN WHT --

## (undated) DEVICE — KERLIX BANDAGE ROLL: Brand: KERLIX

## (undated) DEVICE — SKIN PREP TRAY W/CHG: Brand: MEDLINE INDUSTRIES, INC.

## (undated) DEVICE — MASTISOL ADHESIVE LIQ 2/3ML

## (undated) DEVICE — ZIMMER® STERILE DISPOSABLE TOURNIQUET CUFF WITH PROTECTIVE SLEEVE AND PLC, DUAL PORT, SINGLE BLADDER, 18 IN. (46 CM)

## (undated) DEVICE — SUT PROL 5-0 18IN P3 BLU --

## (undated) DEVICE — STANDARD (U) BLADE ASSEMBLY 6PK: Brand: MICROAIRE®

## (undated) DEVICE — DRAPE,REIN 53X77,STERILE: Brand: MEDLINE

## (undated) DEVICE — IMPERVIOUS SURGICAL GOWN, LG: Brand: CONVERTORS

## (undated) DEVICE — LIGHT HANDLE: Brand: DEVON

## (undated) DEVICE — CURITY NON-ADHERENT STRIPS: Brand: CURITY